# Patient Record
Sex: FEMALE | Race: WHITE | Employment: PART TIME | URBAN - METROPOLITAN AREA
[De-identification: names, ages, dates, MRNs, and addresses within clinical notes are randomized per-mention and may not be internally consistent; named-entity substitution may affect disease eponyms.]

---

## 2017-02-15 ENCOUNTER — OFFICE VISIT (OUTPATIENT)
Dept: NEUROLOGY | Facility: CLINIC | Age: 37
End: 2017-02-15

## 2017-02-15 VITALS
DIASTOLIC BLOOD PRESSURE: 80 MMHG | SYSTOLIC BLOOD PRESSURE: 130 MMHG | BODY MASS INDEX: 37 KG/M2 | WEIGHT: 218 LBS | HEART RATE: 96 BPM | RESPIRATION RATE: 18 BRPM

## 2017-02-15 DIAGNOSIS — G43.709 CHRONIC MIGRAINE WITHOUT AURA WITHOUT STATUS MIGRAINOSUS, NOT INTRACTABLE: Primary | ICD-10-CM

## 2017-02-15 PROCEDURE — 99215 OFFICE O/P EST HI 40 MIN: CPT | Performed by: OTHER

## 2017-02-15 RX ORDER — DIVALPROEX SODIUM 250 MG/1
500 TABLET, EXTENDED RELEASE ORAL DAILY
Qty: 60 TABLET | Refills: 2 | Status: SHIPPED | OUTPATIENT
Start: 2017-02-15 | End: 2017-05-26

## 2017-02-15 RX ORDER — SUMATRIPTAN 50 MG/1
50 TABLET, FILM COATED ORAL EVERY 2 HOUR PRN
COMMUNITY
End: 2017-02-15

## 2017-02-15 RX ORDER — SUMATRIPTAN 50 MG/1
50 TABLET, FILM COATED ORAL EVERY 2 HOUR PRN
Qty: 9 TABLET | Refills: 1 | Status: SHIPPED | OUTPATIENT
Start: 2017-02-15 | End: 2017-06-06

## 2017-02-15 NOTE — PATIENT INSTRUCTIONS
Refill policies:    • Allow 2 business days for refills; controlled substances may take longer.   • Contact your pharmacy at least 5 days prior to running out of medication and have them send an electronic request or submit request through the “request re your physician has recommended that you have a procedure or additional testing performed. [facility]) will contact your insurance carrier to obtain pre-certification or prior authorization.     Unfortunately, SCOTTIE has seen an increas

## 2017-02-15 NOTE — PROGRESS NOTES
[facility] Neurology outpatient progress note  Date of service: 2/15/2017    Patient here for a follow-up visit for migraine headache, Since last visit her headache has been worsening, almost daily; she has failed 2 types of preventive medication and many acute a Solution, INJECT UP TO 75 UNITS UNDER THE SKIN VIA INSULIN PUMP DAILY AS DIRECTED, Disp: 70 mL, Rfl: 1  •  Glucose Blood (ONETOUCH TEST) In Vitro Strip, 1 each by Other route 4 (four) times daily. , Disp: 360 strip, Rfl: 3  •  Fluticasone Propionate (FLONAS intractable  (primary encounter diagnosis): worsening  Plan:   [facility] SCOTTIE BOTOX REFERRAL,   divalproex Sodium  MG Oral Tablet 24 Hr  Cont effexor  imitrex prn  Headache diary advised  Medication overuse headache education given  PCP/ENT follow up re: si

## 2017-03-09 ENCOUNTER — TELEPHONE (OUTPATIENT)
Dept: NEUROLOGY | Facility: CLINIC | Age: 37
End: 2017-03-09

## 2017-03-09 DIAGNOSIS — G43.709 CHRONIC MIGRAINE WITHOUT AURA WITHOUT STATUS MIGRAINOSUS, NOT INTRACTABLE: Primary | ICD-10-CM

## 2017-03-09 NOTE — TELEPHONE ENCOUNTER
Called [employer] and spoke with JASWANT [facility] approval given for 1 treatment only from 2/17/17 - 8/17/17. She will fax me letter. approval [id number]    Call reference #[id number].  Time on call 15:31    I will [facility] AS[facility] appointment is scheduled

## 2017-03-09 NOTE — TELEPHONE ENCOUNTER
Please send a prescription to [facility]:    Botox 200 units,0 refills      Si units to be administered by MD into multiple sites of head, neck and scalp every 3 months for chronic migraine prophylaxis.      Patient appointment scheduled

## 2017-03-13 NOTE — TELEPHONE ENCOUNTER
Spoke to Britany Cervantes at [employer] she started verification of benefits ASA gave her insurance information.

## 2017-03-16 NOTE — TELEPHONE ENCOUNTER
Spoke to Satinder at [facility] they still to not have confirmation that the patient pd her insurance she ran the claim and it is still saying payment needed, she said she can send a fax to the insurance verification team to call the insurance to confirm payment

## 2017-03-21 NOTE — TELEPHONE ENCOUNTER
Received 1 Botox vial 200 unit.  Lot No. [id number]  Exp date 10/2019    Pt has an appt scheduled on 3/22/17

## 2017-03-22 ENCOUNTER — OFFICE VISIT (OUTPATIENT)
Dept: NEUROLOGY | Facility: CLINIC | Age: 37
End: 2017-03-22

## 2017-03-22 VITALS
DIASTOLIC BLOOD PRESSURE: 80 MMHG | SYSTOLIC BLOOD PRESSURE: 140 MMHG | WEIGHT: 218 LBS | BODY MASS INDEX: 37 KG/M2 | HEART RATE: 88 BPM | RESPIRATION RATE: 20 BRPM

## 2017-03-22 DIAGNOSIS — G43.709 CHRONIC MIGRAINE WITHOUT AURA WITHOUT STATUS MIGRAINOSUS, NOT INTRACTABLE: Primary | ICD-10-CM

## 2017-03-22 PROCEDURE — 99215 OFFICE O/P EST HI 40 MIN: CPT | Performed by: OTHER

## 2017-03-22 PROCEDURE — 64615 CHEMODENERV MUSC MIGRAINE: CPT | Performed by: OTHER

## 2017-03-22 NOTE — PATIENT INSTRUCTIONS
Refill policies:    • Allow 2 business days for refills; controlled substances may take longer.   • Contact your pharmacy at least 5 days prior to running out of medication and have them send an electronic request or submit request through the “request re your physician has recommended that you have a procedure or additional testing performed. [facility]) will contact your insurance carrier to obtain pre-certification or prior authorization.     Unfortunately, SCOTTIE has seen an increas

## 2017-03-22 NOTE — PROGRESS NOTES
[facility] Neurology outpatient progress note  Date of service: 3/22/2017    Patient here for a follow-up visit for migraine headache and scheduled 1st botox injection, Since last visit her headache has been worsening, almost daily; she has failed 2 types of prev Disp: , Rfl: 5  •  Omega-3-acid Ethyl Esters 1 G Oral Cap, Take 1 g by mouth daily. , Disp: , Rfl:   •  NOVOLOG 100 UNIT/ML Subcutaneous Solution, INJECT UP TO 75 UNITS UNDER THE SKIN VIA INSULIN PUMP DAILY AS DIRECTED, Disp: 70 mL, Rfl: 1  •  Fluticasone P migrainosus, not intractable  (primary encounter diagnosis): worsening  Plan:    Botox injection after risk and benefit explained    Cont depakote  Cont effexor  imitrex prn  Headache diary advised  Medication overuse headache education given  PCP/ENT foll

## 2017-03-22 NOTE — PROCEDURES
Date of service: 3/22/2017  Procedure: Botox injection -chemodenervation  Consent: obtained after explanation of procedure detail, benefits and risks    Indication:   -chronic migraine patient who suffers 15 or more days with headache lasting 4 hours a day

## 2017-05-26 DIAGNOSIS — G43.709 CHRONIC MIGRAINE WITHOUT AURA WITHOUT STATUS MIGRAINOSUS, NOT INTRACTABLE: Primary | ICD-10-CM

## 2017-05-26 RX ORDER — DIVALPROEX SODIUM 250 MG/1
500 TABLET, EXTENDED RELEASE ORAL DAILY
Qty: 60 TABLET | Refills: 0 | Status: SHIPPED | OUTPATIENT
Start: 2017-05-26 | End: 2017-06-27

## 2017-05-26 NOTE — TELEPHONE ENCOUNTER
Medication: Depakote    Date of last refill: 2/15/17 for #60/2 additional refills  [place]    Last office visit: 3/22/17  Due back to clinic per last office note:  3 months  Date next office visit scheduled:  6/28/17

## 2017-05-30 ENCOUNTER — TELEPHONE (OUTPATIENT)
Dept: NEUROLOGY | Facility: CLINIC | Age: 37
End: 2017-05-30

## 2017-05-30 DIAGNOSIS — G43.709 CHRONIC MIGRAINE WITHOUT AURA WITHOUT STATUS MIGRAINOSUS, NOT INTRACTABLE: Primary | ICD-10-CM

## 2017-05-30 NOTE — TELEPHONE ENCOUNTER
I need to reauthorize the Botox her insurance only allowed one treatment. Please call patient and see if she has had a reduction in her headaches intensity and frequency since Botox and what % of relief.

## 2017-05-30 NOTE — TELEPHONE ENCOUNTER
Please send a prescription to [facility]:    Botox 200 units, 3 refills      Si units to be administered by MD into multiple sites of head, neck and scalp every 3 months for chronic migraine prophylaxis.

## 2017-05-30 NOTE — TELEPHONE ENCOUNTER
Patient states she has noted great relief with her headaches/migraines since starting Botox. Intensity has decreased by at least 25% per patient. Frequency has been decreased by 50% per patient as well.

## 2017-06-06 DIAGNOSIS — G43.709 CHRONIC MIGRAINE WITHOUT AURA WITHOUT STATUS MIGRAINOSUS, NOT INTRACTABLE: Primary | ICD-10-CM

## 2017-06-06 NOTE — TELEPHONE ENCOUNTER
Medication: Sumatriptan    Date of last refill: 2/17/17 #9 with 1 refill  [place]    Last office visit: 3/22/2017  Due back to clinic per last office note:  3 months, botox for migraines  Date next office visit schedu

## 2017-06-07 RX ORDER — SUMATRIPTAN 50 MG/1
50 TABLET, FILM COATED ORAL EVERY 2 HOUR PRN
Qty: 9 TABLET | Refills: 1 | Status: SHIPPED | OUTPATIENT
Start: 2017-06-07 | End: 2018-12-12

## 2017-06-07 NOTE — TELEPHONE ENCOUNTER
Called [employer] spoke with Ed Botox (,[id number]) has been approved for 2 treatments    Approval #[id number] from 5/30/2017 - 11/29/17

## 2017-06-13 NOTE — TELEPHONE ENCOUNTER
Per Vania Vela at [facility] patient has given consent Botox to be delivered on 6-14-17

## 2017-06-14 NOTE — TELEPHONE ENCOUNTER
Received 1 Botox 200 unit vial.  Lot number [id number]  Exp Date 1/2020    Pt has a botox appt scheduled on 6/28/17

## 2017-06-27 DIAGNOSIS — G43.709 CHRONIC MIGRAINE WITHOUT AURA WITHOUT STATUS MIGRAINOSUS, NOT INTRACTABLE: ICD-10-CM

## 2017-06-27 RX ORDER — DIVALPROEX SODIUM 250 MG/1
500 TABLET, EXTENDED RELEASE ORAL DAILY
Qty: 60 TABLET | Refills: 2 | Status: SHIPPED | OUTPATIENT
Start: 2017-06-27 | End: 2017-09-27

## 2017-06-27 NOTE — TELEPHONE ENCOUNTER
Medication: Divalproex ER 250mg tab    Date of last refill: 5/26/17 (#60/0)  [place]    Last office visit: 3/22/2017  Due back to clinic per last office note:  3 months  Date next office visit scheduled:  Future Appo

## 2017-06-28 ENCOUNTER — OFFICE VISIT (OUTPATIENT)
Dept: NEUROLOGY | Facility: CLINIC | Age: 37
End: 2017-06-28

## 2017-06-28 ENCOUNTER — TELEPHONE (OUTPATIENT)
Dept: NEUROLOGY | Facility: CLINIC | Age: 37
End: 2017-06-28

## 2017-06-28 VITALS
HEART RATE: 72 BPM | BODY MASS INDEX: 37 KG/M2 | DIASTOLIC BLOOD PRESSURE: 80 MMHG | WEIGHT: 216 LBS | SYSTOLIC BLOOD PRESSURE: 136 MMHG | RESPIRATION RATE: 16 BRPM

## 2017-06-28 DIAGNOSIS — G43.709 CHRONIC MIGRAINE WITHOUT AURA WITHOUT STATUS MIGRAINOSUS, NOT INTRACTABLE: Primary | ICD-10-CM

## 2017-06-28 PROCEDURE — 99215 OFFICE O/P EST HI 40 MIN: CPT | Performed by: OTHER

## 2017-06-28 PROCEDURE — 64615 CHEMODENERV MUSC MIGRAINE: CPT | Performed by: OTHER

## 2017-06-28 NOTE — PROGRESS NOTES
[facility] Neurology outpatient progress note  Date of service: 6/28/2017    Patient here for a follow-up visit for migraine headache and scheduled 2nf botox injection, Since last visit her headache has improved with botox injection, she has failed 2 types of pre Hr, Take 1 capsule (75 mg total) by mouth daily. , Disp: 30 capsule, Rfl: 3  •  Venlafaxine HCl ER (EFFEXOR XR) 150 MG Oral Capsule SR 24 Hr, Take 2 capsules daily (Patient taking differently: Take 112.5 mg by mouth daily.  ), Disp: 60 capsule, Rfl: 11  • Neurological examination:  /80 (BP Location: Left arm, Patient Position: Sitting, Cuff Size: large)   Pulse 72   Resp 16   Wt 216 lb   LMP 06/28/2017   BMI 37.08 kg/m²   A & O X 3   Language - nl   Speech - nl   CN - intact   Motor 5/5 all extremit

## 2017-06-28 NOTE — PATIENT INSTRUCTIONS
Refill policies:    • Allow 2-3 business days for refills; controlled substances may take longer.   • Contact your pharmacy at least 5 days prior to running out of medication and have them send an electronic request or submit request through the [facility] have a procedure or additional testing performed. [facility]) will contact your insurance carrier to obtain pre-certification or prior authorization.     Unfortunately, SCOTTIE has seen an increase in denial of payment even though the p

## 2017-06-28 NOTE — PROCEDURES
Date of service: 6/28/2017  Procedure: Botox injection -chemodenervation  Consent: obtained after explanation of procedure detail, benefits and risks    Indication:   -chronic migraine patient who suffers 15 or more days with headache lasting 4 hours a day

## 2017-07-19 PROBLEM — F90.0 ATTENTION DEFICIT HYPERACTIVITY DISORDER (ADHD), PREDOMINANTLY INATTENTIVE TYPE: Status: ACTIVE | Noted: 2017-07-19

## 2017-07-20 PROCEDURE — 88175 CYTOPATH C/V AUTO FLUID REDO: CPT | Performed by: FAMILY MEDICINE

## 2017-08-21 PROBLEM — E78.2 MIXED HYPERLIPIDEMIA: Status: ACTIVE | Noted: 2017-08-21

## 2017-08-21 PROBLEM — E55.9 VITAMIN D DEFICIENCY: Status: ACTIVE | Noted: 2017-08-21

## 2017-08-21 PROBLEM — Z46.81 INSULIN PUMP TITRATION: Status: ACTIVE | Noted: 2017-08-21

## 2017-09-19 ENCOUNTER — TELEPHONE (OUTPATIENT)
Dept: SURGERY | Facility: CLINIC | Age: 37
End: 2017-09-19

## 2017-09-19 NOTE — TELEPHONE ENCOUNTER
Spoke to Adrienne Randall.  Tentatively scheduled delivery on 10/04/17 to [place] office    Contacted pt and advised to contact [facility] @ [phone] to give consent for delivery    Pt appt is on 10/11/17

## 2017-09-27 DIAGNOSIS — G43.709 CHRONIC MIGRAINE WITHOUT AURA WITHOUT STATUS MIGRAINOSUS, NOT INTRACTABLE: ICD-10-CM

## 2017-09-27 RX ORDER — DIVALPROEX SODIUM 250 MG/1
500 TABLET, EXTENDED RELEASE ORAL DAILY
Qty: 60 TABLET | Refills: 1 | Status: SHIPPED | OUTPATIENT
Start: 2017-09-27 | End: 2017-11-07

## 2017-09-27 NOTE — TELEPHONE ENCOUNTER
Medication: Divalproex ER    Date of last refill: 06/27/17  [place]    Last office visit: 06/28/17  Due back to clinic per last office note:  3 months  Date next office visit scheduled:  10/11/17    Last OV note rolando

## 2017-10-03 NOTE — TELEPHONE ENCOUNTER
Called [facility]. Spoke to rep Dobbins. Sent stat order to verify benefits.  Confirmed delivery address to [place] on 10/05/17

## 2017-10-05 NOTE — TELEPHONE ENCOUNTER
Called [facility] to confirm Botox shipped for delivery. Spoke to rep Booth. Benefits were verified but scheduled delivery is set for 10/06/17 to [place] office.  Confirmed delivery address

## 2017-10-06 NOTE — TELEPHONE ENCOUNTER
Botox received in [place] office [address] and placed in fridge. Received:  1 Vial, 200 Units of Botox  Lot:  [id number]  Exp:  May 2020  [facility]:  [id number]    Botox appointment scheduled for 10/11/17 in [place].

## 2017-10-11 ENCOUNTER — OFFICE VISIT (OUTPATIENT)
Dept: NEUROLOGY | Facility: CLINIC | Age: 37
End: 2017-10-11

## 2017-10-11 VITALS
HEIGHT: 63.5 IN | HEART RATE: 86 BPM | SYSTOLIC BLOOD PRESSURE: 130 MMHG | DIASTOLIC BLOOD PRESSURE: 84 MMHG | WEIGHT: 205 LBS | BODY MASS INDEX: 35.87 KG/M2 | RESPIRATION RATE: 16 BRPM

## 2017-10-11 DIAGNOSIS — G43.709 CHRONIC MIGRAINE WITHOUT AURA WITHOUT STATUS MIGRAINOSUS, NOT INTRACTABLE: Primary | ICD-10-CM

## 2017-10-11 PROCEDURE — 64615 CHEMODENERV MUSC MIGRAINE: CPT | Performed by: OTHER

## 2017-10-11 PROCEDURE — 99215 OFFICE O/P EST HI 40 MIN: CPT | Performed by: OTHER

## 2017-10-11 RX ORDER — ONDANSETRON 4 MG/1
4 TABLET, ORALLY DISINTEGRATING ORAL EVERY 8 HOURS PRN
Qty: 20 TABLET | Refills: 0 | Status: SHIPPED | OUTPATIENT
Start: 2017-10-11 | End: 2018-07-31 | Stop reason: ALTCHOICE

## 2017-10-11 NOTE — PATIENT INSTRUCTIONS
Refill policies:    • Allow 2-3 business days for refills; controlled substances may take longer.   • Contact your pharmacy at least 5 days prior to running out of medication and have them send an electronic request or submit request through the [facility] have a procedure or additional testing performed. [facility]) will contact your insurance carrier to obtain pre-certification or prior authorization.     Unfortunately, SCOTTIE has seen an increase in denial of payment even though the p

## 2017-10-11 NOTE — PROCEDURES
Date of service: 10/11/2017  Procedure: Botox injection -chemodenervation  Consent: obtained after explanation of procedure detail, benefits and risks    Indication:   -chronic migraine patient who suffers 15 or more days with headache lasting 4 hours a da

## 2017-10-11 NOTE — PROGRESS NOTES
[facility] Neurology outpatient progress note  Date of service: 10/11/2017    Patient here for a follow-up visit for migraine headache and scheduled 3rd botox injection, Since last visit her headache has improved with botox injection, she has failed preventive me prophylaxis, Disp: 200 Units, Rfl: 3  •  ALPRAZolam 0.25 MG Oral Tab, Take 1 tablet (0.25 mg total) by mouth 3 (three) times daily as needed for Sleep.  As needed, Disp: 90 tablet, Rfl: 0  •  NOVOLOG 100 UNIT/ML Subcutaneous Solution, INJECT 75 UNITS UNDER Onset   • Cancer Mother      brain    • Neurological Disorder Sister      meningioma   • Cancer Sister      cervical cancer   • Breast Cancer Paternal Grandmother 61   • Heart Disorder Paternal Grandfather    • Hypertension Paternal Grandfather    • Rossy

## 2017-10-18 PROBLEM — H93.293 ABNORMAL AUDITORY PERCEPTION, BILATERAL: Status: ACTIVE | Noted: 2017-10-18

## 2017-10-18 PROBLEM — H90.3 SENSORY HEARING LOSS, BILATERAL: Status: ACTIVE | Noted: 2017-10-18

## 2017-11-01 PROBLEM — E11.36 DIABETIC CATARACT (HCC): Status: ACTIVE | Noted: 2017-11-01

## 2017-11-01 PROBLEM — Z98.890 HX OF LASIK: Status: ACTIVE | Noted: 2017-11-01

## 2017-11-01 PROBLEM — E10.3593 TYPE 1 DIABETES MELLITUS WITH PROLIFERATIVE RETINOPATHY OF BOTH EYES WITHOUT MACULAR EDEMA (HCC): Status: ACTIVE | Noted: 2017-11-01

## 2017-11-29 ENCOUNTER — TELEPHONE (OUTPATIENT)
Dept: SURGERY | Facility: CLINIC | Age: 37
End: 2017-11-29

## 2017-11-29 NOTE — TELEPHONE ENCOUNTER
Botox Reauthorization Questionnaire:  1. Has the patient experienced a reduction of at least 7 headache days or 100 hours per month since starting Botox? yes  a. If yes, by what percentage? 75%  2.  Has the intensity and frequency of migraines decreased si

## 2017-12-18 ENCOUNTER — TELEPHONE (OUTPATIENT)
Dept: SURGERY | Facility: CLINIC | Age: 37
End: 2017-12-18

## 2017-12-18 DIAGNOSIS — G43.709 CHRONIC MIGRAINE WITHOUT AURA WITHOUT STATUS MIGRAINOSUS, NOT INTRACTABLE: Primary | ICD-10-CM

## 2017-12-18 NOTE — TELEPHONE ENCOUNTER
Please send a prescription to [facility]:    Botox 200 units, 3 refills      Si units to be administered by MD into multiple sites of head, neck and scalp every 3 months for chronic migraine prophylaxis.      Approved 17 - 12/15/18

## 2017-12-19 PROCEDURE — 82570 ASSAY OF URINE CREATININE: CPT | Performed by: INTERNAL MEDICINE

## 2017-12-19 PROCEDURE — 82043 UR ALBUMIN QUANTITATIVE: CPT | Performed by: INTERNAL MEDICINE

## 2017-12-27 NOTE — TELEPHONE ENCOUNTER
Per call to Vj Shah states that benefits are still being verified at this time. Can call back to schedule delivery after 01/01/18.

## 2018-01-02 NOTE — TELEPHONE ENCOUNTER
Per Elia López at [facility] they need patients consent I called patient she will call them today.

## 2018-01-03 NOTE — TELEPHONE ENCOUNTER
Per Yovana Rivera at [facility] patient has not called with consent. I called patient and had to leave a message asking her to call [facility] so we can get the botox delivered otherwise we will have to cancel her appointment.

## 2018-01-04 NOTE — TELEPHONE ENCOUNTER
[employer] and spoke with Joaquin Moore she said that patient did give consent and it will be delivered on 1/9/18. Confirmed [place] address.

## 2018-01-09 NOTE — TELEPHONE ENCOUNTER
Received Botox in [address],[place]. Lot [id number]   Exp Date:8/2020    Pt has Appt scheduled on 1-12-18 with Dr.Y Yuli Hood. Botox given to Nurse.

## 2018-01-17 ENCOUNTER — OFFICE VISIT (OUTPATIENT)
Dept: NEUROLOGY | Facility: CLINIC | Age: 38
End: 2018-01-17

## 2018-01-17 VITALS
HEART RATE: 96 BPM | SYSTOLIC BLOOD PRESSURE: 156 MMHG | DIASTOLIC BLOOD PRESSURE: 80 MMHG | BODY MASS INDEX: 34 KG/M2 | WEIGHT: 200 LBS | RESPIRATION RATE: 20 BRPM

## 2018-01-17 DIAGNOSIS — G43.709 CHRONIC MIGRAINE WITHOUT AURA WITHOUT STATUS MIGRAINOSUS, NOT INTRACTABLE: Primary | ICD-10-CM

## 2018-01-17 PROCEDURE — 64615 CHEMODENERV MUSC MIGRAINE: CPT | Performed by: OTHER

## 2018-01-17 PROCEDURE — 99215 OFFICE O/P EST HI 40 MIN: CPT | Performed by: OTHER

## 2018-01-17 NOTE — PROCEDURES
Date of service: 1/17/2018  Procedure: Botox injection -chemodenervation  Consent: obtained after explanation of procedure detail, benefits and risks    Indication:   -chronic migraine patient who suffers 15 or more days with headache lasting 4 hours a day

## 2018-01-17 NOTE — PROGRESS NOTES
[facility] Neurology outpatient progress note  Date of service: 1/17/2018    Patient here for a follow-up visit for migraine headache and scheduled 4th botox injection, Since last visit her headache has improved with botox injection overall, however, but she had Lisdexamfetamine Dimesylate (VYVANSE) 40 MG Oral Cap, Take 1 capsule (40 mg total) by mouth daily. , Disp: 30 capsule, Rfl: 0  •  ondansetron 4 MG Oral Tablet Dispersible, Take 1 tablet (4 mg total) by mouth every 8 (eight) hours as needed for Nausea., Disp Comment: s/p ocular muscle reconstruction  No date: TUBAL LIGATION  Social History:     Smoking status: Current Every Day Smoker  0.50 Packs/day     Types: Cigarettes    Smokeless tobacco: Current User    Alcohol use No    Comment: recovering alcholic injection in office today    Cont current meds  zofran prn  imitrex prn  Headache diary advised  Medication overuse headache education given  PCP/ENT follow up re: sinus condition  See orders and medications filed with this encounter.  The patient indicates

## 2018-01-17 NOTE — PROGRESS NOTES
States last Botox not as effective but not sure if that was due to weather. Paperwork noting that patient may bear financial responsibility for procedure(s) performed in clinic today signed prior to proceeding with procedure(s).     Furthermore, patient n

## 2018-01-17 NOTE — PATIENT INSTRUCTIONS
Refill policies:    • Allow 2-3 business days for refills; controlled substances may take longer.   • Contact your pharmacy at least 5 days prior to running out of medication and have them send an electronic request or submit request through the [facility] recommended that you have a procedure or additional testing performed. [facility]) will contact your insurance carrier to obtain pre-certification or prior authorization.     Unfortunately, [facility] has seen an increase in denial of paym

## 2018-04-06 ENCOUNTER — TELEPHONE (OUTPATIENT)
Dept: SURGERY | Facility: CLINIC | Age: 38
End: 2018-04-06

## 2018-04-06 NOTE — TELEPHONE ENCOUNTER
I called patient to start getting Botox delivered for her April 2018 appointment and she informed me that she has new insurance that started on 1/1/18 and she thought she gave that to us.l I explained that we did not have that in the system I would have to

## 2018-04-09 NOTE — TELEPHONE ENCOUNTER
This Botox should not be delivered on 4/11/18 the patient has new insurance for 2018 and there is no predetermination for the new plan (new ID# and group #)    I called [employer] and spoke with Emily Zamarripa and confirmed that a new predetermination needs to be done bec

## 2018-04-09 NOTE — TELEPHONE ENCOUNTER
Scheduled delivery for botox on Wednesday, April 11th. [facility]:  [facility] [facility], [address] [place] [address], [place], [address]. Attn:  Dr. Mo Scott. Will call pt to schedule an appt.

## 2018-04-26 NOTE — TELEPHONE ENCOUNTER
Called [facility] of [facility] and spoke with Nicolette Farley Botox was approved. Approval #[id number] - 6 visits from 4/6/18 - 10/6/18    Elizabeth Wiggins they had put the RX on hold and now Mitzi will start it again for processing gave her approval # and ID#.

## 2018-05-02 PROBLEM — E10.3593 TYPE 1 DIABETES MELLITUS WITH PROLIFERATIVE RETINOPATHY OF BOTH EYES WITHOUT MACULAR EDEMA (HCC): Status: RESOLVED | Noted: 2017-11-01 | Resolved: 2018-05-02

## 2018-05-02 PROBLEM — E10.3493: Status: ACTIVE | Noted: 2018-05-02

## 2018-05-02 PROBLEM — E10.36: Status: ACTIVE | Noted: 2017-11-01

## 2018-05-09 NOTE — TELEPHONE ENCOUNTER
Loki Christian from [facility] called and said that they can not process this patients Botox refill because she is no active ad of 5/1/18 and in her jef period. I called [employer] and that is correct she is only paid up until 5/1/18.     I called patient and left a

## 2018-05-09 NOTE — TELEPHONE ENCOUNTER
Pippa Weaver called from [facility] to have Botox delivered to the [place] office on 5/10/18 patient gave consent and copay if needed.  Confirmed address and hours

## 2018-05-10 NOTE — TELEPHONE ENCOUNTER
Received Botox in [address],[place]  Lot DO:[id number]   Exp Date:12/2020    Botox given to Nurse, Will Check with Dr.Y Michi Beaver for Appt to be scheduled for Pt.

## 2018-05-16 ENCOUNTER — OFFICE VISIT (OUTPATIENT)
Dept: NEUROLOGY | Facility: CLINIC | Age: 38
End: 2018-05-16

## 2018-05-16 VITALS
SYSTOLIC BLOOD PRESSURE: 136 MMHG | WEIGHT: 205 LBS | DIASTOLIC BLOOD PRESSURE: 78 MMHG | HEIGHT: 64 IN | BODY MASS INDEX: 35 KG/M2 | RESPIRATION RATE: 16 BRPM | HEART RATE: 96 BPM

## 2018-05-16 DIAGNOSIS — G43.709 CHRONIC MIGRAINE WITHOUT AURA WITHOUT STATUS MIGRAINOSUS, NOT INTRACTABLE: Primary | ICD-10-CM

## 2018-05-16 PROCEDURE — 99214 OFFICE O/P EST MOD 30 MIN: CPT | Performed by: OTHER

## 2018-05-16 PROCEDURE — 64615 CHEMODENERV MUSC MIGRAINE: CPT | Performed by: OTHER

## 2018-05-16 NOTE — PATIENT INSTRUCTIONS
Refill policies:    • Allow 2-3 business days for refills; controlled substances may take longer.   • Contact your pharmacy at least 5 days prior to running out of medication and have them send an electronic request or submit request through the “request re entire amount billed. Precertification and Prior Authorizations: If your physician has recommended that you have a procedure or additional testing performed.   [facility]) will contact your insurance carrier to obtain pre-certi

## 2018-05-16 NOTE — PROCEDURES
Date of service: 5/16/2018  Procedure: Botox injection -chemodenervation  Consent: obtained after explanation of procedure detail, benefits and risks    Indication:   -chronic migraine patient who suffers 15 or more days with headache lasting 4 hours a day

## 2018-05-16 NOTE — PROGRESS NOTES
[facility] Neurology outpatient progress note  Date of service: 5/16/2018    Patient here for a follow-up visit for migraine headache and scheduled 5th botox injection, Since last visit her headache has improved with botox injection.   She had about >10 days heada BLOOD SUGAR FOUR TIMES DAILY, Disp: 1 kit, Rfl: 0  •  atorvastatin 20 MG Oral Tab, Take 1 tablet (20 mg total) by mouth nightly., Disp: 90 tablet, Rfl: 3  •  OnabotulinumtoxinA (BOTOX) 200 units Injection Recon Soln, 155 units to be administered by MD into cerebral meninges     Strong family history   • Migraines    • Obesity      Past Surgical History:  No date:       Comment: x 4  No date: OTHER SURGICAL HISTORY      Comment: s/p LASIK  2017: OTHER SURGICAL HISTORY      Comment: Liposuction  No d education given  PCP/ENT follow up re: sinus condition  See orders and medications filed with this encounter. The patient indicates understanding of these issues and agrees with the plan.   Discussed with patient in detail regarding the adverse and side eff

## 2018-07-24 PROBLEM — Z80.3 FAMILY HISTORY OF BREAST CANCER: Status: ACTIVE | Noted: 2018-07-24

## 2018-07-24 PROBLEM — Z82.61 FAMILY HISTORY OF RHEUMATOID ARTHRITIS: Status: ACTIVE | Noted: 2018-07-24

## 2018-07-24 PROCEDURE — 87480 CANDIDA DNA DIR PROBE: CPT | Performed by: FAMILY MEDICINE

## 2018-07-24 PROCEDURE — 87491 CHLMYD TRACH DNA AMP PROBE: CPT | Performed by: FAMILY MEDICINE

## 2018-07-24 PROCEDURE — 87510 GARDNER VAG DNA DIR PROBE: CPT | Performed by: FAMILY MEDICINE

## 2018-07-24 PROCEDURE — 87591 N.GONORRHOEAE DNA AMP PROB: CPT | Performed by: FAMILY MEDICINE

## 2018-07-24 PROCEDURE — 87660 TRICHOMONAS VAGIN DIR PROBE: CPT | Performed by: FAMILY MEDICINE

## 2018-07-31 ENCOUNTER — APPOINTMENT (OUTPATIENT)
Dept: LAB | Facility: HOSPITAL | Age: 38
End: 2018-07-31
Payer: COMMERCIAL

## 2018-07-31 DIAGNOSIS — S82.63XA: ICD-10-CM

## 2018-07-31 PROBLEM — S82.61XA CLOSED HIGH LATERAL MALLEOLUS FRACTURE, RIGHT, INITIAL ENCOUNTER: Status: ACTIVE | Noted: 2018-07-31

## 2018-07-31 LAB
ATRIAL RATE: 78 BPM
P-R INTERVAL: 146 MS
Q-T INTERVAL: 364 MS
QRS DURATION: 82 MS
QTC CALCULATION (BEZET): 414 MS
R AXIS: -64 DEGREES
T AXIS: -63 DEGREES
VENTRICULAR RATE: 78 BPM

## 2018-07-31 PROCEDURE — 93005 ELECTROCARDIOGRAM TRACING: CPT

## 2018-07-31 PROCEDURE — 93010 ELECTROCARDIOGRAM REPORT: CPT | Performed by: INTERNAL MEDICINE

## 2018-07-31 RX ORDER — FLASH GLUCOSE SENSOR
KIT MISCELLANEOUS
COMMUNITY
End: 2020-11-02

## 2018-08-01 ENCOUNTER — ANESTHESIA EVENT (OUTPATIENT)
Dept: SURGERY | Facility: HOSPITAL | Age: 38
End: 2018-08-01

## 2018-08-02 ENCOUNTER — APPOINTMENT (OUTPATIENT)
Dept: GENERAL RADIOLOGY | Facility: HOSPITAL | Age: 38
End: 2018-08-02
Attending: ORTHOPAEDIC SURGERY
Payer: COMMERCIAL

## 2018-08-02 ENCOUNTER — HOSPITAL ENCOUNTER (OUTPATIENT)
Facility: HOSPITAL | Age: 38
Setting detail: HOSPITAL OUTPATIENT SURGERY
Discharge: HOME | End: 2018-08-02
Attending: ORTHOPAEDIC SURGERY | Admitting: ORTHOPAEDIC SURGERY
Payer: COMMERCIAL

## 2018-08-02 ENCOUNTER — SURGERY (OUTPATIENT)
Age: 38
End: 2018-08-02

## 2018-08-02 ENCOUNTER — ANESTHESIA (OUTPATIENT)
Dept: SURGERY | Facility: HOSPITAL | Age: 38
End: 2018-08-02

## 2018-08-02 VITALS
HEIGHT: 64 IN | OXYGEN SATURATION: 100 % | SYSTOLIC BLOOD PRESSURE: 130 MMHG | BODY MASS INDEX: 33.29 KG/M2 | DIASTOLIC BLOOD PRESSURE: 67 MMHG | RESPIRATION RATE: 16 BRPM | TEMPERATURE: 98 F | HEART RATE: 74 BPM | WEIGHT: 195 LBS

## 2018-08-02 DIAGNOSIS — S82.63XA: Primary | ICD-10-CM

## 2018-08-02 DIAGNOSIS — S82.61XA CLOSED HIGH LATERAL MALLEOLUS FRACTURE, RIGHT, INITIAL ENCOUNTER: ICD-10-CM

## 2018-08-02 DIAGNOSIS — M25.571 RIGHT ANKLE PAIN, UNSPECIFIED CHRONICITY: ICD-10-CM

## 2018-08-02 LAB
ANION GAP SERPL CALC-SCNC: 9 MMOL/L (ref 0–18)
BUN BLD-MCNC: 6 MG/DL (ref 8–20)
BUN/CREAT SERPL: 9.4 (ref 10–20)
CALCIUM BLD-MCNC: 8.9 MG/DL (ref 8.3–10.3)
CHLORIDE SERPL-SCNC: 97 MMOL/L (ref 101–111)
CO2 SERPL-SCNC: 25 MMOL/L (ref 22–32)
CREAT BLD-MCNC: 0.64 MG/DL (ref 0.55–1.02)
GLUCOSE BLD-MCNC: 173 MG/DL (ref 65–99)
GLUCOSE BLD-MCNC: 292 MG/DL (ref 65–99)
GLUCOSE BLD-MCNC: 305 MG/DL (ref 70–99)
OSMOLALITY SERPL CALC.SUM OF ELEC: 281 MOSM/KG (ref 275–295)
POCT LOT NUMBER: NORMAL
POCT URINE PREGNANCY: NEGATIVE
POTASSIUM SERPL-SCNC: 4.8 MMOL/L (ref 3.6–5.1)
SODIUM SERPL-SCNC: 131 MMOL/L (ref 136–144)

## 2018-08-02 PROCEDURE — 0QSJ04Z REPOSITION RIGHT FIBULA WITH INTERNAL FIXATION DEVICE, OPEN APPROACH: ICD-10-PCS | Performed by: ORTHOPAEDIC SURGERY

## 2018-08-02 PROCEDURE — 82962 GLUCOSE BLOOD TEST: CPT

## 2018-08-02 PROCEDURE — 3E0T3BZ INTRODUCTION OF ANESTHETIC AGENT INTO PERIPHERAL NERVES AND PLEXI, PERCUTANEOUS APPROACH: ICD-10-PCS | Performed by: ANESTHESIOLOGY

## 2018-08-02 PROCEDURE — 80048 BASIC METABOLIC PNL TOTAL CA: CPT | Performed by: ANESTHESIOLOGY

## 2018-08-02 PROCEDURE — 76001 XR FLUOROSCOPE EXAM >1 HR EXTENSIVE (CPT=76001): CPT | Performed by: ORTHOPAEDIC SURGERY

## 2018-08-02 PROCEDURE — 76942 ECHO GUIDE FOR BIOPSY: CPT | Performed by: ORTHOPAEDIC SURGERY

## 2018-08-02 DEVICE — SCREW CORT 3.5X16 4835-16-01
Type: IMPLANTABLE DEVICE | Site: ANKLE | Status: NON-FUNCTIONAL
Removed: 2019-03-07

## 2018-08-02 DEVICE — IMPLANTABLE DEVICE: Type: IMPLANTABLE DEVICE | Site: ANKLE | Status: FUNCTIONAL

## 2018-08-02 DEVICE — SCREW CORT 3.5X14 4835-14-01: Type: IMPLANTABLE DEVICE | Site: ANKLE | Status: FUNCTIONAL

## 2018-08-02 DEVICE — SCREW CORT 3.5X18 4835-18-01: Type: IMPLANTABLE DEVICE | Site: ANKLE | Status: FUNCTIONAL

## 2018-08-02 RX ORDER — ACETAMINOPHEN 500 MG
1000 TABLET ORAL EVERY 6 HOURS PRN
Status: ON HOLD | COMMUNITY
End: 2018-08-02

## 2018-08-02 RX ORDER — DEXTROSE MONOHYDRATE 25 G/50ML
50 INJECTION, SOLUTION INTRAVENOUS
Status: DISCONTINUED | OUTPATIENT
Start: 2018-08-02 | End: 2018-08-02 | Stop reason: HOSPADM

## 2018-08-02 RX ORDER — CEFAZOLIN SODIUM/WATER 2 G/20 ML
2 SYRINGE (ML) INTRAVENOUS ONCE
Status: COMPLETED | OUTPATIENT
Start: 2018-08-02 | End: 2018-08-02

## 2018-08-02 RX ORDER — HYDROCODONE BITARTRATE AND ACETAMINOPHEN 5; 325 MG/1; MG/1
2 TABLET ORAL AS NEEDED
Status: COMPLETED | OUTPATIENT
Start: 2018-08-02 | End: 2018-08-02

## 2018-08-02 RX ORDER — SODIUM CHLORIDE, SODIUM LACTATE, POTASSIUM CHLORIDE, CALCIUM CHLORIDE 600; 310; 30; 20 MG/100ML; MG/100ML; MG/100ML; MG/100ML
INJECTION, SOLUTION INTRAVENOUS CONTINUOUS
Status: DISCONTINUED | OUTPATIENT
Start: 2018-08-02 | End: 2018-08-02

## 2018-08-02 RX ORDER — NALOXONE HYDROCHLORIDE 0.4 MG/ML
80 INJECTION, SOLUTION INTRAMUSCULAR; INTRAVENOUS; SUBCUTANEOUS AS NEEDED
Status: DISCONTINUED | OUTPATIENT
Start: 2018-08-02 | End: 2018-08-02

## 2018-08-02 RX ORDER — ONDANSETRON 2 MG/ML
4 INJECTION INTRAMUSCULAR; INTRAVENOUS AS NEEDED
Status: DISCONTINUED | OUTPATIENT
Start: 2018-08-02 | End: 2018-08-02

## 2018-08-02 RX ORDER — ACETAMINOPHEN 500 MG
1000 TABLET ORAL ONCE
Status: DISCONTINUED | OUTPATIENT
Start: 2018-08-02 | End: 2018-08-02 | Stop reason: HOSPADM

## 2018-08-02 RX ORDER — LABETALOL HYDROCHLORIDE 5 MG/ML
5 INJECTION, SOLUTION INTRAVENOUS EVERY 5 MIN PRN
Status: DISCONTINUED | OUTPATIENT
Start: 2018-08-02 | End: 2018-08-02

## 2018-08-02 RX ORDER — MEPERIDINE HYDROCHLORIDE 25 MG/ML
12.5 INJECTION INTRAMUSCULAR; INTRAVENOUS; SUBCUTANEOUS AS NEEDED
Status: DISCONTINUED | OUTPATIENT
Start: 2018-08-02 | End: 2018-08-02

## 2018-08-02 RX ORDER — DIPHENHYDRAMINE HYDROCHLORIDE 50 MG/ML
12.5 INJECTION INTRAMUSCULAR; INTRAVENOUS AS NEEDED
Status: DISCONTINUED | OUTPATIENT
Start: 2018-08-02 | End: 2018-08-02

## 2018-08-02 RX ORDER — DEXTROSE MONOHYDRATE 25 G/50ML
50 INJECTION, SOLUTION INTRAVENOUS
Status: DISCONTINUED | OUTPATIENT
Start: 2018-08-02 | End: 2018-08-02

## 2018-08-02 RX ORDER — HYDROCODONE BITARTRATE AND ACETAMINOPHEN 5; 325 MG/1; MG/1
1 TABLET ORAL AS NEEDED
Status: COMPLETED | OUTPATIENT
Start: 2018-08-02 | End: 2018-08-02

## 2018-08-02 RX ORDER — MIDAZOLAM HYDROCHLORIDE 1 MG/ML
1 INJECTION INTRAMUSCULAR; INTRAVENOUS EVERY 5 MIN PRN
Status: DISCONTINUED | OUTPATIENT
Start: 2018-08-02 | End: 2018-08-02

## 2018-08-02 RX ORDER — HYDROCODONE BITARTRATE AND ACETAMINOPHEN 10; 325 MG/1; MG/1
1 TABLET ORAL EVERY 4 HOURS PRN
Qty: 40 TABLET | Refills: 0 | Status: SHIPPED | OUTPATIENT
Start: 2018-08-02 | End: 2018-08-07

## 2018-08-02 RX ORDER — MORPHINE SULFATE 4 MG/ML
2 INJECTION, SOLUTION INTRAMUSCULAR; INTRAVENOUS EVERY 5 MIN PRN
Status: DISCONTINUED | OUTPATIENT
Start: 2018-08-02 | End: 2018-08-02

## 2018-08-02 NOTE — H&P
History & Physical Examination    Patient Name: Isaac Linda  MRN: [id number]  [facility]: [id number]  YOB: 1980    Diagnosis: Right Patel C fibula fracture    Present Illness: HISTORY OF PRESENT ILLNESS:  Ms. Izzy Ko is a 38-yea seems like there is a little bit of widening, and I would probably recommend going ahead and performing open reduction and internal fixation to stabilize the fibula fracture into a better position and hopefully prevent any ankle instability down the line. differently: INJECT 75 UNITS UNDER THE SKIN VIA INSULIN PUMP DAILY AS DIRECTED. ACE. Basal rate: midnight  - 0600 2.0;  0600 - 1800 - 1.45; 1800 - midnight - 1.8. Insulin to carb ratio 1:15.  CF - 1:45.) Disp: 70 mL Rfl: 1 Taking   atorvastatin 20 Once   glucose (DEX4) oral liquid 15 g 15 g Oral Q15 Min PRN   Or      Glucose-Vitamin C (DEX-4) 4-0.006 g chewable tab 4 tablet 4 tablet Oral Q15 Min PRN   Or      dextrose 50 % injection 50 mL 50 mL Intravenous Q15 Min PRN   Or      glucose (DEX4) oral l Son    • ADHD Son         Smoking status: Current Every Day Smoker  1.00 Packs/day  For 20.00 Years     Types: Cigarettes    Smokeless tobacco: Current User    Alcohol use No    Comment: recovering alcholic       SYSTEM Check if Review is Normal Check if P

## 2018-08-02 NOTE — BRIEF OP NOTE
Pre-Operative Diagnosis: Closed high lateral malleolus fracture, right, initial encounter [S82.61XA]  Right ankle pain, unspecified chronicity [M25.571]     Post-Operative Diagnosis: Closed high lateral malleolus fracture, right, initial encounter [S50. Dilcia Kelsey

## 2018-08-02 NOTE — ANESTHESIA POSTPROCEDURE EVALUATION
[address] Patient Status:  Hospital Outpatient Surgery   Age/Gender 34year old female MRN [id number]   [facility] SURGERY Attending Dick Bates MD   Hosp Day # 0 PCP Colt Espinal MD       Anesthesia Pos

## 2018-08-02 NOTE — OPERATIVE REPORT
[facility]    PATIENT'S NAME: Jean Carlos Luz   ATTENDING PHYSICIAN: Ryder Mcgowan M.D. OPERATING PHYSICIAN: Ryder Mcgowan M.D.    PATIENT ACCOUNT#:   [de-identified]    LOCATION:  PACU [address] [place] [address] PACU 11 EDWP 10  MEDICAL RECORD #:   [id number] identified in the preoperative holding area and the correct ankle, which is the right ankle, was identified and marked. She received preoperative prophylactic antibiotics.   The patient was taken to the operative suite and placed supine on the operative ta screws and the 1 additional distal screw leaving the most distal screw open. AP and lateral plane fluoroscopy confirmed anatomic alignment of the fracture and excellent hardware positioning. I then performed stress x-ray under live fluoroscopy.   After 14:27:19  t: 08/02/2018 14:50:16  Job [id number]  BRISA/

## 2018-08-02 NOTE — ANESTHESIA PREPROCEDURE EVALUATION
PRE-OP EVALUATION    Patient Name: Jose Marina    Pre-op Diagnosis: Closed high lateral malleolus fracture, right, initial encounter [S82.61XA]  Right ankle pain, unspecified chronicity [M25.571]    Procedure(s):  OPEN REDUCTION INTERNAL FIXAT total) by mouth daily. Disp: 30 capsule Rfl: 0   ALPRAZolam 0.25 MG Oral Tab Take 1 tablet (0.25 mg total) by mouth 3 (three) times daily as needed for Sleep.  As needed Disp: 90 tablet Rfl: 5   NOVOLOG 100 UNIT/ML Subcutaneous Solution INJECT 75 UNITS UNDE Neuro/Psych      (+) depression  (+) anxiety  (-) CVA     (-) seizures    (+) headaches                 Past Surgical History:  No date:       Comment: x 4  No date: OTHER SURGICAL HISTORY      Comment: s/p LASIK  2017: OTHER SURGICAL HISTORY regional anesthesia  Plan/risks discussed with: patient                Present on Admission:  **None**

## 2018-08-17 PROBLEM — S82.61XD: Status: ACTIVE | Noted: 2018-08-17

## 2018-08-17 PROBLEM — Z87.81 S/P ORIF (OPEN REDUCTION INTERNAL FIXATION) FRACTURE: Status: ACTIVE | Noted: 2018-08-17

## 2018-08-17 PROBLEM — Z98.890 S/P ORIF (OPEN REDUCTION INTERNAL FIXATION) FRACTURE: Status: ACTIVE | Noted: 2018-08-17

## 2018-10-11 PROBLEM — E10.65 TYPE 1 DIABETES MELLITUS WITH HYPERGLYCEMIA (HCC): Status: ACTIVE | Noted: 2018-10-11

## 2018-11-07 PROBLEM — E10.3513 TYPE 1 DIABETES MELLITUS WITH PROLIFERATIVE RETINOPATHY OF BOTH EYES AND MACULAR EDEMA (HCC): Status: ACTIVE | Noted: 2018-05-02

## 2018-12-03 ENCOUNTER — TELEPHONE (OUTPATIENT)
Dept: SURGERY | Facility: CLINIC | Age: 38
End: 2018-12-03

## 2018-12-03 DIAGNOSIS — G43.709 CHRONIC MIGRAINE WITHOUT AURA WITHOUT STATUS MIGRAINOSUS, NOT INTRACTABLE: Primary | ICD-10-CM

## 2018-12-03 NOTE — TELEPHONE ENCOUNTER
Please send a prescription to [address] [place], [place] [address]way:    Botox 200 units, 3 refills      Si units to be administered by MD into multiple sites of head, neck and scalp every 3 months for chronic migraine prophylaxis.      ASAP patient has appointment

## 2018-12-07 PROCEDURE — 82570 ASSAY OF URINE CREATININE: CPT | Performed by: INTERNAL MEDICINE

## 2018-12-07 PROCEDURE — 82043 UR ALBUMIN QUANTITATIVE: CPT | Performed by: INTERNAL MEDICINE

## 2018-12-07 PROCEDURE — 86431 RHEUMATOID FACTOR QUANT: CPT | Performed by: FAMILY MEDICINE

## 2018-12-10 ENCOUNTER — TELEPHONE (OUTPATIENT)
Dept: SURGERY | Facility: CLINIC | Age: 38
End: 2018-12-10

## 2018-12-10 NOTE — TELEPHONE ENCOUNTER
This goes to [facility]. [facility] sent them RX I spoke with Sara Escudero the pharmacist to verify this is set at stat so we can have the medication by 12/11/18.

## 2018-12-10 NOTE — TELEPHONE ENCOUNTER
This is at stat I called [facility] and spoke with Jaciel he said this should be ready to ship by this afternoon.

## 2018-12-10 NOTE — TELEPHONE ENCOUNTER
Patient called back and said that she just called [facility] and gave consent and went over copay. I called [facility] and spoke with Zbigniew Crespo she spoke with her supervisor and it will be delivered on 12/11/18 to the [facility].

## 2018-12-10 NOTE — TELEPHONE ENCOUNTER
When I called to ge the Botox delivered I was told by Padmini that it is going to take 24-48 hours to go through insurance verification I asked to talk to a supervisor.  I spoke with Berna Salmon a supervisor in insurance verification and he said this will be ready f

## 2018-12-12 ENCOUNTER — OFFICE VISIT (OUTPATIENT)
Dept: NEUROLOGY | Facility: CLINIC | Age: 38
End: 2018-12-12
Payer: COMMERCIAL

## 2018-12-12 ENCOUNTER — TELEPHONE (OUTPATIENT)
Dept: NEUROLOGY | Facility: CLINIC | Age: 38
End: 2018-12-12

## 2018-12-12 VITALS
DIASTOLIC BLOOD PRESSURE: 74 MMHG | BODY MASS INDEX: 36 KG/M2 | SYSTOLIC BLOOD PRESSURE: 126 MMHG | RESPIRATION RATE: 18 BRPM | HEART RATE: 76 BPM | WEIGHT: 204 LBS

## 2018-12-12 DIAGNOSIS — G43.709 CHRONIC MIGRAINE WITHOUT AURA WITHOUT STATUS MIGRAINOSUS, NOT INTRACTABLE: Primary | ICD-10-CM

## 2018-12-12 PROCEDURE — 64615 CHEMODENERV MUSC MIGRAINE: CPT | Performed by: OTHER

## 2018-12-12 PROCEDURE — 99215 OFFICE O/P EST HI 40 MIN: CPT | Performed by: OTHER

## 2018-12-12 RX ORDER — SUMATRIPTAN 50 MG/1
50 TABLET, FILM COATED ORAL EVERY 2 HOUR PRN
Qty: 9 TABLET | Refills: 1 | Status: SHIPPED | OUTPATIENT
Start: 2018-12-12 | End: 2019-10-23

## 2018-12-12 RX ORDER — ONDANSETRON 4 MG/1
4 TABLET, ORALLY DISINTEGRATING ORAL EVERY 8 HOURS PRN
Qty: 10 TABLET | Refills: 0 | Status: SHIPPED | OUTPATIENT
Start: 2018-12-12 | End: 2019-10-23

## 2018-12-12 NOTE — TELEPHONE ENCOUNTER
Patient to start [address] [place] [address] for migraines. While in office, patient completed an Aimovig questionnaire. Rx sent directly to preferred pharmacy. RN demonstrated injection technique to patient using Demonstration Kit.   Patient given following Aimovig handout

## 2018-12-12 NOTE — PROGRESS NOTES
[facility] Neurology outpatient progress note  Date of service: 12/12/2018    Patient here for a follow-up visit for migraine headache and scheduled botox injection, Since last visit her headache has gotten worse in the past 1-2 weeks, she is overdue for botox in UNIT/ML Subcutaneous Solution, INJECT 75 UNITS UNDER THE SKIN DAILY AS DIRECTED VIA INSULIN PUMP, Disp: 70 mL, Rfl: 0  •  OnabotulinumtoxinA (BOTOX) 200 units Injection Recon Soln, 155 units to be administered by MD into multiple sites of head, neck and sc Allergies:    Dander                  ITCHING  Dust                    ITCHING    Comment:Sneezing  Pesticides              OTHER (SEE COMMENTS)    Comment: Throws up food that had been sprayed with             pesticides.   Past Medical History:   Diagnos Patient Position: Sitting, Cuff Size: large)   Pulse 76   Resp 18   Wt 204 lb   BMI 36.14 kg/m²   Lungs: clear to auscultation   CV: S1, S2+  Abdomen: soft, non tender, no masses  Extremities: no edema  Carotid bruits: no  Neurological Examination:  Mental if aimovig is helping migraine.     Romayne Backbone, MD  Neurology  [facility]  12/12/2018, 1:39 PM  Zeke Underwood MD

## 2018-12-12 NOTE — PROGRESS NOTES
The patient is here for Botox. The patient state her migraines have increased since her last office visit.

## 2018-12-12 NOTE — PROCEDURES
Date of service: 12/12/2018  Procedure: Botox injection -chemodenervation  Consent: obtained after explanation of procedure detail, benefits and risks    Indication:   -chronic migraine patient who suffers 15 or more days with headache lasting 4 hours a da

## 2018-12-13 NOTE — TELEPHONE ENCOUNTER
Had to call [employer] patient would not come up on cover my meds    Called [phone] and spoke with Marlen Morrow she said that this is not covered under patients plan but will sent for an exclusion of benefits.  He took all information and said it woul

## 2018-12-17 NOTE — TELEPHONE ENCOUNTER
Denied     Denial Reason: You must have failed two alternative drugs.  You may not have to try these drugs if your prescriber states you cannot take them due to a side effect or Food and Drug Administration (FDA) labeled contraindication to these alterna

## 2018-12-18 PROCEDURE — 36415 COLL VENOUS BLD VENIPUNCTURE: CPT | Performed by: INTERNAL MEDICINE

## 2018-12-18 PROCEDURE — 83516 IMMUNOASSAY NONANTIBODY: CPT | Performed by: INTERNAL MEDICINE

## 2018-12-18 PROCEDURE — 86256 FLUORESCENT ANTIBODY TITER: CPT | Performed by: INTERNAL MEDICINE

## 2018-12-18 NOTE — TELEPHONE ENCOUNTER
Spoke with PA team are looking into this as pt has tried/failed 2 medications: Topiramate & Divalproex.

## 2018-12-21 NOTE — TELEPHONE ENCOUNTER
Spoke to Luis Fernando at [employer] and she said if patient has tried the medication listed on the denial (which she has) then sent a fax to [phone] on the cover sheet with clinicals. Faxed today with fax confirmed received.

## 2018-12-21 NOTE — TELEPHONE ENCOUNTER
LMTCB to discuss that Yoel Burch is currently denied. Can still continue to use access card to get medication at little/no cost and we can try PA later as continuation therapy.

## 2018-12-21 NOTE — TELEPHONE ENCOUNTER
Denied     Denial Reason:    -You must have not received an injection of a drug called Botox in the last 2 months to help prevent headaches.     -You must not be starting treatment with an injection of a drug called Botox to help prevent headaches after sta

## 2019-01-04 PROBLEM — Z96.9 RETAINED ORTHOPEDIC HARDWARE: Status: ACTIVE | Noted: 2019-01-04

## 2019-01-04 PROBLEM — T84.84XA PAINFUL ORTHOPAEDIC HARDWARE (HCC): Status: ACTIVE | Noted: 2019-01-04

## 2019-01-07 NOTE — TELEPHONE ENCOUNTER
To date, pt has not returned call. Is on MyChart but per Epic review, not actively using. Letter sent to home address that Gay Emmanuelpablito currently denied but can continue to use Access Card. Can try PA at a later date if medication is helping her.

## 2019-01-14 NOTE — TELEPHONE ENCOUNTER
Pt states that pharmacy deleted old Rx, needing new one sent. Resent to preferred pharmacy. Pt has access card. No further action needed from out office at this time.

## 2019-02-21 ENCOUNTER — TELEPHONE (OUTPATIENT)
Dept: SURGERY | Facility: CLINIC | Age: 39
End: 2019-02-21

## 2019-02-21 DIAGNOSIS — G43.709 CHRONIC MIGRAINE WITHOUT AURA WITHOUT STATUS MIGRAINOSUS, NOT INTRACTABLE: Primary | ICD-10-CM

## 2019-02-21 NOTE — TELEPHONE ENCOUNTER
Please send a prescription to [facility]:    Botox 200 units, 3 refills      Si units to be administered by MD into multiple sites of head, neck and scalp every 3 months for chronic migraine prophylaxis.      Appointment 3/13/19

## 2019-02-25 ENCOUNTER — LAB ENCOUNTER (OUTPATIENT)
Dept: LAB | Age: 39
End: 2019-02-25
Attending: ORTHOPAEDIC SURGERY
Payer: COMMERCIAL

## 2019-02-25 DIAGNOSIS — E10.65 TYPE 1 DIABETES MELLITUS WITH HYPERGLYCEMIA (HCC): ICD-10-CM

## 2019-02-25 LAB
ALBUMIN SERPL-MCNC: 3.8 G/DL (ref 3.4–5)
ALBUMIN/GLOB SERPL: 1 {RATIO} (ref 1–2)
ALP LIVER SERPL-CCNC: 118 U/L (ref 37–98)
ALT SERPL-CCNC: 28 U/L (ref 13–56)
ANION GAP SERPL CALC-SCNC: 6 MMOL/L (ref 0–18)
AST SERPL-CCNC: 16 U/L (ref 15–37)
BILIRUB SERPL-MCNC: 0.6 MG/DL (ref 0.1–2)
BUN BLD-MCNC: 4 MG/DL (ref 7–18)
BUN/CREAT SERPL: 5.9 (ref 10–20)
CALCIUM BLD-MCNC: 8.7 MG/DL (ref 8.5–10.1)
CHLORIDE SERPL-SCNC: 95 MMOL/L (ref 98–107)
CO2 SERPL-SCNC: 29 MMOL/L (ref 21–32)
CREAT BLD-MCNC: 0.68 MG/DL (ref 0.55–1.02)
GLOBULIN PLAS-MCNC: 3.8 G/DL (ref 2.8–4.4)
GLUCOSE BLD-MCNC: 280 MG/DL (ref 70–99)
M PROTEIN MFR SERPL ELPH: 7.6 G/DL (ref 6.4–8.2)
OSMOLALITY SERPL CALC.SUM OF ELEC: 277 MOSM/KG (ref 275–295)
POTASSIUM SERPL-SCNC: 5.1 MMOL/L (ref 3.5–5.1)
SODIUM SERPL-SCNC: 130 MMOL/L (ref 136–145)

## 2019-02-25 PROCEDURE — 80053 COMPREHEN METABOLIC PANEL: CPT

## 2019-02-25 PROCEDURE — 36415 COLL VENOUS BLD VENIPUNCTURE: CPT

## 2019-02-25 NOTE — PROGRESS NOTES
Glucose 70 - 99 mg/dL 280 Abnormally high   260 Abnormally high   305 Abnormally high   236 Abnormally high   119 Abnormally high   209 Abnormally high  R  Sodium 136 - 145 mmol/L 130 Abnormally low   133 Abnormally low  R 131 Abnormally low  R 132 Abnorma

## 2019-03-02 PROBLEM — F17.200 SMOKER: Status: ACTIVE | Noted: 2019-03-02

## 2019-03-04 ENCOUNTER — LAB ENCOUNTER (OUTPATIENT)
Dept: LAB | Facility: HOSPITAL | Age: 39
End: 2019-03-04
Attending: FAMILY MEDICINE
Payer: COMMERCIAL

## 2019-03-04 ENCOUNTER — TELEPHONE (OUTPATIENT)
Dept: SURGERY | Facility: CLINIC | Age: 39
End: 2019-03-04

## 2019-03-04 DIAGNOSIS — R53.83 FATIGUE, UNSPECIFIED TYPE: ICD-10-CM

## 2019-03-04 DIAGNOSIS — E87.1 HYPONATREMIA: ICD-10-CM

## 2019-03-04 LAB
ANION GAP SERPL CALC-SCNC: 7 MMOL/L (ref 0–18)
BASOPHILS # BLD AUTO: 0.03 X10(3) UL (ref 0–0.2)
BASOPHILS NFR BLD AUTO: 0.3 %
BUN BLD-MCNC: 9 MG/DL (ref 7–18)
BUN/CREAT SERPL: 11.7 (ref 10–20)
CALCIUM BLD-MCNC: 8.7 MG/DL (ref 8.5–10.1)
CHLORIDE SERPL-SCNC: 98 MMOL/L (ref 98–107)
CO2 SERPL-SCNC: 28 MMOL/L (ref 21–32)
CREAT BLD-MCNC: 0.77 MG/DL (ref 0.55–1.02)
DEPRECATED RDW RBC AUTO: 39.6 FL (ref 35.1–46.3)
EOSINOPHIL # BLD AUTO: 0.11 X10(3) UL (ref 0–0.7)
EOSINOPHIL NFR BLD AUTO: 1.2 %
ERYTHROCYTE [DISTWIDTH] IN BLOOD BY AUTOMATED COUNT: 11.8 % (ref 11–15)
GLUCOSE BLD-MCNC: 272 MG/DL (ref 70–99)
HCT VFR BLD AUTO: 42.6 % (ref 35–48)
HGB BLD-MCNC: 15.2 G/DL (ref 12–16)
IMM GRANULOCYTES # BLD AUTO: 0.04 X10(3) UL (ref 0–1)
IMM GRANULOCYTES NFR BLD: 0.4 %
LYMPHOCYTES # BLD AUTO: 2.59 X10(3) UL (ref 1–4)
LYMPHOCYTES NFR BLD AUTO: 27.6 %
MCH RBC QN AUTO: 33.3 PG (ref 26–34)
MCHC RBC AUTO-ENTMCNC: 35.7 G/DL (ref 31–37)
MCV RBC AUTO: 93.2 FL (ref 80–100)
MONOCYTES # BLD AUTO: 0.67 X10(3) UL (ref 0.1–1)
MONOCYTES NFR BLD AUTO: 7.1 %
NEUTROPHILS # BLD AUTO: 5.95 X10 (3) UL (ref 1.5–7.7)
NEUTROPHILS # BLD AUTO: 5.95 X10(3) UL (ref 1.5–7.7)
NEUTROPHILS NFR BLD AUTO: 63.4 %
OSMOLALITY SERPL CALC.SUM OF ELEC: 284 MOSM/KG (ref 275–295)
PLATELET # BLD AUTO: 314 10(3)UL (ref 150–450)
PLATELET MORPHOLOGY: NORMAL
POTASSIUM SERPL-SCNC: 4.8 MMOL/L (ref 3.5–5.1)
RBC # BLD AUTO: 4.57 X10(6)UL (ref 3.8–5.3)
SODIUM SERPL-SCNC: 133 MMOL/L (ref 136–145)
WBC # BLD AUTO: 9.4 X10(3) UL (ref 4–11)

## 2019-03-04 PROCEDURE — 36415 COLL VENOUS BLD VENIPUNCTURE: CPT

## 2019-03-04 PROCEDURE — 80048 BASIC METABOLIC PNL TOTAL CA: CPT

## 2019-03-04 PROCEDURE — 85025 COMPLETE CBC W/AUTO DIFF WBC: CPT

## 2019-03-04 NOTE — TELEPHONE ENCOUNTER
[address] Wiggins and spoke to [facility] she said that this is still in insurance verification status it should be done soon.

## 2019-03-06 ENCOUNTER — LAB ENCOUNTER (OUTPATIENT)
Dept: LAB | Facility: HOSPITAL | Age: 39
End: 2019-03-06
Attending: FAMILY MEDICINE
Payer: COMMERCIAL

## 2019-03-06 ENCOUNTER — TELEPHONE (OUTPATIENT)
Dept: SURGERY | Facility: CLINIC | Age: 39
End: 2019-03-06

## 2019-03-06 DIAGNOSIS — R71.8 RBC MICROCYTOSIS: ICD-10-CM

## 2019-03-06 DIAGNOSIS — R71.8 ANISOCYTOSIS: ICD-10-CM

## 2019-03-06 DIAGNOSIS — D75.89 MACROCYTOSIS WITHOUT ANEMIA: ICD-10-CM

## 2019-03-06 DIAGNOSIS — R71.8 RED BLOOD CELL MORPHOLOGY ABNORMALITY: ICD-10-CM

## 2019-03-06 LAB
DEPRECATED HBV CORE AB SER IA-ACNC: 14 NG/ML (ref 12–240)
PLATELET MORPHOLOGY: NORMAL
VIT B12 SERPL-MCNC: 880 PG/ML (ref 193–986)

## 2019-03-06 PROCEDURE — 36415 COLL VENOUS BLD VENIPUNCTURE: CPT

## 2019-03-06 PROCEDURE — 82728 ASSAY OF FERRITIN: CPT

## 2019-03-06 PROCEDURE — 82607 VITAMIN B-12: CPT

## 2019-03-06 NOTE — TELEPHONE ENCOUNTER
I called [facility] and spoke with Ricardo English she said this has been sitting in insurance verification since February she transferred me to the insurance department, I spoke with Bigg she sent a email stat to have this finish gave her approval #.  I explaine

## 2019-03-08 NOTE — TELEPHONE ENCOUNTER
[address] Wiggins again and this is not ready yet I spoke with Melissa Ward a  she will sent a stat message today but it might take 24-48 hours, I explained her appointment is on 3/13/19 and it needs to delivered on 3/12/19.

## 2019-03-11 NOTE — TELEPHONE ENCOUNTER
[employer] and spoke with Mendel Maciel in the insurance verification department she had me on hold 50:23 while she called [employer] to verify she was covered and to get benefits.  I was transferred to the delivery department and spoke with Curry Aceves and she becky

## 2019-03-12 NOTE — TELEPHONE ENCOUNTER
[employer] and spoke with Conor she said patient has not given consent. I called the patient and canceled her appointment for 3/13/19 due to not having Botox in office.  I left a message asking her to call [facility] to go over benefits and give c

## 2019-03-13 NOTE — TELEPHONE ENCOUNTER
Lencho called [address] [facility] [facility] and said that patient has given a blanket consent today (as long as co-pay does not change its ok to ship). Botox will be delivered to the [place] office on 3/14/19.  Confirmed address and hours    ONCE BOTOX IS RECEIVED PL

## 2019-03-14 NOTE — TELEPHONE ENCOUNTER
Botox received in:  [facility], [address] [facility], [address] [place] [address]  [address]    Lot No: [id number] Exp Date: 06/2021  # Units: 200     Pt has Appt scheduled on 3/15/19  with Dr. Carmen Vergara.    Julianne Najera

## 2019-03-15 ENCOUNTER — TELEPHONE (OUTPATIENT)
Dept: NEUROLOGY | Facility: CLINIC | Age: 39
End: 2019-03-15

## 2019-03-26 ENCOUNTER — TELEPHONE (OUTPATIENT)
Dept: NEUROLOGY | Facility: CLINIC | Age: 39
End: 2019-03-26

## 2019-03-26 NOTE — TELEPHONE ENCOUNTER
Patient suffered possible concussion on 3/23.  Patient also has question about rescheduling botox due to head injury

## 2019-03-26 NOTE — TELEPHONE ENCOUNTER
Patient calling [facility] to state that she may have possible concussion. Patient states she hit her head on 3/23/2019 and felt nauseous and slept the entire weekend. Patient states she has a slight headache at this time.  Denies vision changes, severe headache o

## 2019-04-19 ENCOUNTER — OFFICE VISIT (OUTPATIENT)
Dept: NEUROLOGY | Facility: CLINIC | Age: 39
End: 2019-04-19
Payer: COMMERCIAL

## 2019-04-19 VITALS
SYSTOLIC BLOOD PRESSURE: 130 MMHG | HEART RATE: 78 BPM | DIASTOLIC BLOOD PRESSURE: 70 MMHG | RESPIRATION RATE: 16 BRPM | HEIGHT: 64 IN | BODY MASS INDEX: 37.22 KG/M2 | TEMPERATURE: 97 F | WEIGHT: 218 LBS

## 2019-04-19 DIAGNOSIS — G43.709 CHRONIC MIGRAINE WITHOUT AURA WITHOUT STATUS MIGRAINOSUS, NOT INTRACTABLE: Primary | ICD-10-CM

## 2019-04-19 PROCEDURE — 64615 CHEMODENERV MUSC MIGRAINE: CPT | Performed by: OTHER

## 2019-04-19 NOTE — PROCEDURES
Date of service: 4/19/2019  Procedure: Botox injection -chemodenervation  Consent: obtained after explanation of procedure detail, benefits and risks    Indication:   -chronic migraine patient who suffers 15 or more days with headache lasting 4 hours a day

## 2019-04-30 PROBLEM — E10.43: Status: ACTIVE | Noted: 2019-04-30

## 2019-05-22 PROBLEM — K21.9 GASTROESOPHAGEAL REFLUX DISEASE WITHOUT ESOPHAGITIS: Status: ACTIVE | Noted: 2019-05-22

## 2019-05-22 PROBLEM — R11.2 NAUSEA WITH VOMITING, UNSPECIFIED: Status: ACTIVE | Noted: 2019-05-22

## 2019-05-22 PROBLEM — R68.81 EARLY SATIETY: Status: ACTIVE | Noted: 2019-05-22

## 2019-05-22 PROBLEM — K29.30 CHRONIC SUPERFICIAL GASTRITIS WITHOUT BLEEDING: Status: ACTIVE | Noted: 2019-05-22

## 2019-06-21 ENCOUNTER — LAB ENCOUNTER (OUTPATIENT)
Dept: LAB | Facility: HOSPITAL | Age: 39
End: 2019-06-21
Attending: INTERNAL MEDICINE
Payer: COMMERCIAL

## 2019-06-21 ENCOUNTER — HOSPITAL ENCOUNTER (OUTPATIENT)
Dept: ULTRASOUND IMAGING | Facility: HOSPITAL | Age: 39
Discharge: HOME | End: 2019-06-21
Attending: INTERNAL MEDICINE
Payer: COMMERCIAL

## 2019-06-21 DIAGNOSIS — K21.9 GASTROESOPHAGEAL REFLUX DISEASE WITHOUT ESOPHAGITIS: ICD-10-CM

## 2019-06-21 DIAGNOSIS — K29.30 CHRONIC SUPERFICIAL GASTRITIS WITHOUT BLEEDING: ICD-10-CM

## 2019-06-21 DIAGNOSIS — R71.8 ABNORMAL RBC MORPHOLOGY: ICD-10-CM

## 2019-06-21 DIAGNOSIS — R68.81 EARLY SATIETY: ICD-10-CM

## 2019-06-21 DIAGNOSIS — R11.2 NAUSEA AND VOMITING, INTRACTABILITY OF VOMITING NOT SPECIFIED, UNSPECIFIED VOMITING TYPE: ICD-10-CM

## 2019-06-21 PROCEDURE — 83516 IMMUNOASSAY NONANTIBODY: CPT | Performed by: INTERNAL MEDICINE

## 2019-06-21 PROCEDURE — 36415 COLL VENOUS BLD VENIPUNCTURE: CPT | Performed by: INTERNAL MEDICINE

## 2019-06-21 PROCEDURE — 82784 ASSAY IGA/IGD/IGG/IGM EACH: CPT | Performed by: INTERNAL MEDICINE

## 2019-06-21 PROCEDURE — 76700 US EXAM ABDOM COMPLETE: CPT | Performed by: INTERNAL MEDICINE

## 2019-06-21 PROCEDURE — 85025 COMPLETE CBC W/AUTO DIFF WBC: CPT

## 2019-06-21 PROCEDURE — 80053 COMPREHEN METABOLIC PANEL: CPT | Performed by: INTERNAL MEDICINE

## 2019-06-21 PROCEDURE — 84439 ASSAY OF FREE THYROXINE: CPT | Performed by: INTERNAL MEDICINE

## 2019-06-21 NOTE — PROGRESS NOTES
The recheck of blood counts showed no significant abnormalities. Results sent to the patient through My Chart.

## 2019-07-03 ENCOUNTER — APPOINTMENT (OUTPATIENT)
Dept: LAB | Age: 39
End: 2019-07-03
Attending: INTERNAL MEDICINE
Payer: COMMERCIAL

## 2019-07-03 DIAGNOSIS — R74.8 ELEVATED ALKALINE PHOSPHATASE LEVEL: ICD-10-CM

## 2019-07-03 LAB — GGT SERPL-CCNC: 34 U/L (ref 5–55)

## 2019-07-03 PROCEDURE — 83516 IMMUNOASSAY NONANTIBODY: CPT

## 2019-07-03 PROCEDURE — 36415 COLL VENOUS BLD VENIPUNCTURE: CPT

## 2019-07-03 PROCEDURE — 82977 ASSAY OF GGT: CPT

## 2019-07-05 LAB — MITOCHONDRIAL M2 AB, IGG: 13.2 UNITS

## 2019-07-08 ENCOUNTER — TELEPHONE (OUTPATIENT)
Dept: NEUROLOGY | Facility: CLINIC | Age: 39
End: 2019-07-08

## 2019-07-08 NOTE — TELEPHONE ENCOUNTER
Called [facility] back and spoke with Barb Espinoza they have patients consent. Botox will be delivered to the [place] office on 7/11/19.  Confirmed address and hours

## 2019-07-11 NOTE — TELEPHONE ENCOUNTER
Botox received in:  [facility] [place], [address] [facility] [address], [address] [place] [id number]  [address]    Lot No: [id number]  Exp Date: 01/2022  # Units: 200     Pt has Appt scheduled on 7/19/19 with Dr. Rob Owens.    Kamini Carvajal

## 2019-07-17 ENCOUNTER — HOSPITAL ENCOUNTER (OUTPATIENT)
Dept: NUCLEAR MEDICINE | Facility: HOSPITAL | Age: 39
Discharge: HOME | End: 2019-07-17
Attending: PHYSICIAN ASSISTANT
Payer: COMMERCIAL

## 2019-07-17 DIAGNOSIS — R10.9 ABDOMINAL CRAMPING: ICD-10-CM

## 2019-07-17 DIAGNOSIS — R10.11 RIGHT UPPER QUADRANT ABDOMINAL PAIN: ICD-10-CM

## 2019-07-17 DIAGNOSIS — R14.0 BLOATING: ICD-10-CM

## 2019-07-17 DIAGNOSIS — R11.0 NAUSEA: ICD-10-CM

## 2019-07-17 DIAGNOSIS — R19.5 LOOSE STOOLS: ICD-10-CM

## 2019-07-17 DIAGNOSIS — R19.8 IRREGULAR BOWEL HABITS: ICD-10-CM

## 2019-07-17 DIAGNOSIS — R10.13 EPIGASTRIC PAIN: ICD-10-CM

## 2019-07-17 PROCEDURE — 78227 HEPATOBIL SYST IMAGE W/DRUG: CPT | Performed by: PHYSICIAN ASSISTANT

## 2019-07-19 ENCOUNTER — OFFICE VISIT (OUTPATIENT)
Dept: NEUROLOGY | Facility: CLINIC | Age: 39
End: 2019-07-19
Payer: COMMERCIAL

## 2019-07-19 VITALS
RESPIRATION RATE: 16 BRPM | SYSTOLIC BLOOD PRESSURE: 122 MMHG | WEIGHT: 212 LBS | BODY MASS INDEX: 36 KG/M2 | HEART RATE: 68 BPM | DIASTOLIC BLOOD PRESSURE: 70 MMHG

## 2019-07-19 DIAGNOSIS — G43.709 CHRONIC MIGRAINE WITHOUT AURA WITHOUT STATUS MIGRAINOSUS, NOT INTRACTABLE: Primary | ICD-10-CM

## 2019-07-19 PROCEDURE — 64615 CHEMODENERV MUSC MIGRAINE: CPT | Performed by: OTHER

## 2019-07-19 PROCEDURE — 99214 OFFICE O/P EST MOD 30 MIN: CPT | Performed by: OTHER

## 2019-07-19 NOTE — PROGRESS NOTES
[facility] Neurology outpatient progress note  Date of service: 7/19/2019    Patient here for a follow-up visit for migraine headache and scheduled botox injection, Since last visit her headache has gotten worse in the past 1-2 weeks.  But botox did help her migra administered by MD into multiple sites of head, neck and scalp every 3 months for chronic migraine prophylaxis. , Disp: 200 Units, Rfl: 3  •  ATORVASTATIN 20 MG Oral Tab, TAKE 1 TABLET(20 MG) BY MOUTH EVERY NIGHT, Disp: 90 tablet, Rfl: 3  •  Glucose Blood ( Anesthesia complication    • Anxiety    • Arthritis    • Back pain    • Bad breath    • Bloating    • Body piercing    • Decorative tattoo    • Depression     Psychiatry: Dr. Fadi Garcia   • [facility]    • Diabetes mellitus type 1, controll Paternal Grandfather    • Hypertension Paternal Grandfather    • Depression Daughter    • Anxiety Daughter    • Anxiety Son    • Depression Son    • Breast Cancer Paternal Aunt 61   • Anxiety Son    • Depression Son    • ADHD Son       Physical Examination Lucyann Ormond, MD

## 2019-07-19 NOTE — PROCEDURES
Date of service: 7/19/2019  Procedure: Botox injection -chemodenervation  Consent: obtained after explanation of procedure detail, benefits and risks    Indication:   -chronic migraine patient who suffers 15 or more days with headache lasting 4 hours a day

## 2019-07-24 PROBLEM — T84.84XA PAINFUL ORTHOPAEDIC HARDWARE (HCC): Status: RESOLVED | Noted: 2019-01-04 | Resolved: 2019-07-24

## 2019-07-24 PROBLEM — E10.3593 TYPE 1 DIABETES MELLITUS WITH PROLIFERATIVE RETINOPATHY OF BOTH EYES WITHOUT MACULAR EDEMA (HCC): Status: RESOLVED | Noted: 2017-11-01 | Resolved: 2019-07-24

## 2019-08-05 PROBLEM — K31.84 GASTROPARESIS: Status: ACTIVE | Noted: 2019-08-05

## 2019-08-21 PROBLEM — R19.7 DIARRHEA: Status: ACTIVE | Noted: 2019-08-21

## 2019-09-30 ENCOUNTER — TELEPHONE (OUTPATIENT)
Dept: SURGERY | Facility: CLINIC | Age: 39
End: 2019-09-30

## 2019-09-30 NOTE — TELEPHONE ENCOUNTER
[facility] [phone] regarding predetermination faxed on 9/9/19 with confirmation confirmed. Spoke with Salo Anthony she said that no preD is found for codes  and [address]. Call reference #[id number].  Time on call 12:02    I re faxed as a

## 2019-10-14 NOTE — TELEPHONE ENCOUNTER
Approval received. Approval #[id number] 6 visits from 10/14/19-4/14/21    [facility] and spoke with Maria T Ariza this was close as starting to soon she will have to reopen it should be done by later today or tomorrow morning.

## 2019-10-15 NOTE — TELEPHONE ENCOUNTER
Elizabeth Wiggins and felix Browne patient has given a blanket consent but this is still in insurance verification. He spoke with that department to get this done stat so it can be delivered this week.

## 2019-10-16 NOTE — TELEPHONE ENCOUNTER
[facility] and spoke with Ele Palomares. Patient gave consent. The Botox will be shipped to the [place] office on 10/18/19. Confirmed address and hours    He indicated at the end that this has to go through there insurance department.  I explained yessica

## 2019-10-18 NOTE — TELEPHONE ENCOUNTER
Botox received in:  [facility], [address] [place], [place] [id number]  [address]    Lot No: [id number]  Exp Date: 04/2020  # Units: 200     Pt has Appt scheduled on 10/23/19 with Dr. Gregory Parrish

## 2019-10-19 ENCOUNTER — APPOINTMENT (OUTPATIENT)
Dept: LAB | Facility: HOSPITAL | Age: 39
End: 2019-10-19
Attending: PHYSICIAN ASSISTANT
Payer: COMMERCIAL

## 2019-10-19 DIAGNOSIS — R10.13 EPIGASTRIC PAIN: ICD-10-CM

## 2019-10-19 DIAGNOSIS — E10.649 TYPE 1 DIABETES MELLITUS WITH HYPOGLYCEMIA AND WITHOUT COMA (HCC): ICD-10-CM

## 2019-10-19 DIAGNOSIS — R74.8 ELEVATED LIVER ENZYMES: ICD-10-CM

## 2019-10-19 DIAGNOSIS — R19.5 LOOSE STOOLS: ICD-10-CM

## 2019-10-19 DIAGNOSIS — E10.3593 TYPE 1 DIABETES MELLITUS WITH PROLIFERATIVE RETINOPATHY OF BOTH EYES WITHOUT MACULAR EDEMA (HCC): ICD-10-CM

## 2019-10-19 DIAGNOSIS — R14.0 BLOATING: ICD-10-CM

## 2019-10-19 DIAGNOSIS — E10.43 TYPE 1 DIABETES MELLITUS WITH AUTONOMIC NEUROPATHY (HCC): ICD-10-CM

## 2019-10-19 DIAGNOSIS — Z46.81 INSULIN PUMP TITRATION: ICD-10-CM

## 2019-10-19 DIAGNOSIS — E10.65 TYPE 1 DIABETES MELLITUS WITH HYPERGLYCEMIA (HCC): ICD-10-CM

## 2019-10-19 DIAGNOSIS — R11.0 NAUSEA: ICD-10-CM

## 2019-10-19 DIAGNOSIS — R10.9 ABDOMINAL CRAMPING: ICD-10-CM

## 2019-10-19 DIAGNOSIS — R10.11 RIGHT UPPER QUADRANT ABDOMINAL PAIN: ICD-10-CM

## 2019-10-19 DIAGNOSIS — R19.8 IRREGULAR BOWEL HABITS: ICD-10-CM

## 2019-10-19 DIAGNOSIS — E78.2 MIXED HYPERLIPIDEMIA: ICD-10-CM

## 2019-10-19 PROCEDURE — 80061 LIPID PANEL: CPT

## 2019-10-19 PROCEDURE — 36415 COLL VENOUS BLD VENIPUNCTURE: CPT

## 2019-10-19 PROCEDURE — 80053 COMPREHEN METABOLIC PANEL: CPT

## 2019-10-19 PROCEDURE — 82043 UR ALBUMIN QUANTITATIVE: CPT

## 2019-10-19 PROCEDURE — 82306 VITAMIN D 25 HYDROXY: CPT

## 2019-10-19 PROCEDURE — 82570 ASSAY OF URINE CREATININE: CPT

## 2019-10-23 ENCOUNTER — OFFICE VISIT (OUTPATIENT)
Dept: NEUROLOGY | Facility: CLINIC | Age: 39
End: 2019-10-23
Payer: COMMERCIAL

## 2019-10-23 ENCOUNTER — TELEPHONE (OUTPATIENT)
Dept: NEUROLOGY | Facility: CLINIC | Age: 39
End: 2019-10-23

## 2019-10-23 VITALS
DIASTOLIC BLOOD PRESSURE: 76 MMHG | RESPIRATION RATE: 16 BRPM | WEIGHT: 208 LBS | BODY MASS INDEX: 36 KG/M2 | SYSTOLIC BLOOD PRESSURE: 128 MMHG | HEART RATE: 80 BPM

## 2019-10-23 DIAGNOSIS — G43.709 CHRONIC MIGRAINE WITHOUT AURA WITHOUT STATUS MIGRAINOSUS, NOT INTRACTABLE: ICD-10-CM

## 2019-10-23 PROCEDURE — 99214 OFFICE O/P EST MOD 30 MIN: CPT | Performed by: OTHER

## 2019-10-23 PROCEDURE — 64615 CHEMODENERV MUSC MIGRAINE: CPT | Performed by: OTHER

## 2019-10-23 RX ORDER — ONDANSETRON 4 MG/1
4 TABLET, ORALLY DISINTEGRATING ORAL EVERY 8 HOURS PRN
Qty: 10 TABLET | Refills: 0 | Status: SHIPPED | OUTPATIENT
Start: 2019-10-23 | End: 2020-02-10

## 2019-10-23 NOTE — PROCEDURES
Date of service: 10/23/2019  Procedure: Botox injection -chemodenervation  Consent: obtained after explanation of procedure detail, benefits and risks    Indication:   -chronic migraine patient who suffers 15 or more days with headache lasting 4 hours a da Neurology and Headache Medicine     Deni Rivera MD

## 2019-10-23 NOTE — TELEPHONE ENCOUNTER
Pt in office today for Botox with Dr. Vicki Buchanan. Pt wanting to try new anti-CGRP medications. Discussed with pt that insurance will not typically pay for both Botox and new migraine injectable medications. Verbalized understanding.      Is wanting to put [facility]

## 2019-10-23 NOTE — PROGRESS NOTES
[facility] Neurology outpatient progress note  Date of service: 10/23/2019    Patient here for a follow-up visit for migraine headache and scheduled botox injection, Since last visit her headache has gotten worse in the past 2-4 weeks before next injection.  But b TABLET BY MOUTH THREE TIMES DAILY AS NEEDED SLEEP, Disp: 90 tablet, Rfl: 0  •  Desvenlafaxine Succinate ER (PRISTIQ) 50 MG Oral Tablet 24 Hr, Take 1 tablet (50 mg total) by mouth daily with breakfast., Disp: 90 tablet, Rfl: 3  •  buPROPion HCl ER, XL, (WEL pesticides.   Past Medical History:   Diagnosis Date   • Abdominal distention    • Abdominal pain    • Alcoholism in recovery [facility])    • Anesthesia complication    • Anxiety    • Arthritis    • Back pain    • Bad breath    • Bloating    • Body piercing    • Mother         brain    • Neurological Disorder Sister         meningioma   • Cancer Sister         cervical cancer   • No Known Problems Father    • Breast Cancer Paternal Grandmother 56   • Heart Disorder Paternal Grandfather    • Hypertension Paternal G Sravani Mitchell MD

## 2019-11-11 ENCOUNTER — TELEPHONE (OUTPATIENT)
Dept: NEUROLOGY | Facility: CLINIC | Age: 39
End: 2019-11-11

## 2019-11-11 NOTE — TELEPHONE ENCOUNTER
Fax received from [address] [place] [address] stating patient is agreeable with trying Aimovig or Emgality. MLTCB to schedule iliana't with nurse for information and instruction regarding chosen CGRP.

## 2019-12-18 ENCOUNTER — APPOINTMENT (OUTPATIENT)
Dept: LAB | Age: 39
End: 2019-12-18
Attending: NURSE PRACTITIONER
Payer: COMMERCIAL

## 2019-12-18 DIAGNOSIS — E55.9 VITAMIN D DEFICIENCY: ICD-10-CM

## 2019-12-18 DIAGNOSIS — E10.649 TYPE 1 DIABETES MELLITUS WITH HYPOGLYCEMIA AND WITHOUT COMA (HCC): ICD-10-CM

## 2019-12-18 PROCEDURE — 84443 ASSAY THYROID STIM HORMONE: CPT

## 2019-12-18 PROCEDURE — 82306 VITAMIN D 25 HYDROXY: CPT

## 2020-01-28 ENCOUNTER — TELEPHONE (OUTPATIENT)
Dept: SURGERY | Facility: CLINIC | Age: 40
End: 2020-01-28

## 2020-01-28 NOTE — TELEPHONE ENCOUNTER
Spoke to ZOHREH Palmer at [employer] patient has given consent and the Botox will be delivered to the [place] office on 1/29/20.  Confirmed address and hours

## 2020-01-30 NOTE — TELEPHONE ENCOUNTER
[employer] and spoke with [facility] she said that the insurance is verified for 2020 but now they need a new consent for 2020. I did a three was call to the patient. She gave a blanket consent for 2020 and went over benefits.  [facility] said this woul

## 2020-01-30 NOTE — TELEPHONE ENCOUNTER
Called [facility] at 8:30am and spoke with oJnelle Diez. She said the insurance benefits needed verification. I asked then why was I able to set this up for delivery the patient has an appointment on 1/31/20.  She said she will send a stat to that department t

## 2020-01-31 ENCOUNTER — OFFICE VISIT (OUTPATIENT)
Dept: NEUROLOGY | Facility: CLINIC | Age: 40
End: 2020-01-31
Payer: COMMERCIAL

## 2020-01-31 ENCOUNTER — TELEPHONE (OUTPATIENT)
Dept: NEUROLOGY | Facility: CLINIC | Age: 40
End: 2020-01-31

## 2020-01-31 VITALS
WEIGHT: 221 LBS | HEART RATE: 66 BPM | BODY MASS INDEX: 38 KG/M2 | RESPIRATION RATE: 16 BRPM | SYSTOLIC BLOOD PRESSURE: 124 MMHG | DIASTOLIC BLOOD PRESSURE: 70 MMHG

## 2020-01-31 DIAGNOSIS — G43.009 MIGRAINE WITHOUT AURA AND WITHOUT STATUS MIGRAINOSUS, NOT INTRACTABLE: Primary | ICD-10-CM

## 2020-01-31 PROCEDURE — 64615 CHEMODENERV MUSC MIGRAINE: CPT | Performed by: OTHER

## 2020-01-31 PROCEDURE — 99214 OFFICE O/P EST MOD 30 MIN: CPT | Performed by: OTHER

## 2020-01-31 NOTE — TELEPHONE ENCOUNTER
Please ordered the following medication for this pt and provide education:    Ubrelvy 50mg oral tab  SIG: Take one tab by mouth at onset of migraine. May repeat dose after 2 hours. Max two doses in 24 hours.   Qty: 8  Refills: 0     Who should not take UB These are not all of the possible side effects of UBRELVY. What is UBRELVY™ (ubrogepant)? Katherne Colander is a prescription medicine used for the acute treatment of migraine attacks with or without aura in adults.  UBRELVY is not used to prevent migraine headac

## 2020-01-31 NOTE — TELEPHONE ENCOUNTER
Botox received in:  [facility], [address] [id number]  [address]    Lot No: [id number]  Exp Date: 6/22  # Units: 200     Pt has Appt scheduled on 1/31/2020 with Dr. Svitlana Reece.    Botox

## 2020-01-31 NOTE — PROCEDURES
Date of service: 1/31/2020  Procedure: Botox injection -chemodenervation  Consent: obtained after explanation of procedure detail, benefits and risks    Indication:   -chronic migraine patient who suffers 15 or more days with headache lasting 4 hours a day Neurology and Headache Medicine     Moncho Canales MD

## 2020-01-31 NOTE — TELEPHONE ENCOUNTER
See LOV note from 01/31/2020. Pt was educated, and given savings card for [employer]. Order called in to pt preferred pharmacy, spoke with Pharmacist Benito Knowles, received with read back confirmation.

## 2020-01-31 NOTE — PROGRESS NOTES
[facility] Neurology outpatient progress note  Date of service: 1/31/2020    Patient here for a follow-up visit for migraine headache and scheduled botox injection, Since last visit her headache has gotten worse in the past 2-4 weeks before next injection.  But sindy Solution, INJECT 75 UNITS UNDER THE SKIN DAILY AS DIRECTED VIA INSULIN PUMP, Disp: 70 mL, Rfl: 2  •  Continuous Blood Gluc Sensor (FREESTYLE ALISA 14 DAY SENSOR) Does not apply Misc, 1 each by Does not apply route every 14 (fourteen) days. , Disp: 6 each, R Medical History:   Diagnosis Date   • Abdominal distention    • Abdominal pain    • Alcoholism in recovery [facility])    • Anesthesia complication    • Anxiety    • Arthritis    • Back pain    • Bad breath    • Bloating    • Body piercing    • Decorative tattoo brain    • Neurological Disorder Sister         meningioma   • Cancer Sister         cervical cancer   • No Known Problems Father    • Breast Cancer Paternal Grandmother 61   • Heart Disorder Paternal Grandfather    • Hypertension Paternal Grandfather    •

## 2020-02-10 DIAGNOSIS — G43.709 CHRONIC MIGRAINE WITHOUT AURA WITHOUT STATUS MIGRAINOSUS, NOT INTRACTABLE: ICD-10-CM

## 2020-02-10 DIAGNOSIS — G43.009 MIGRAINE WITHOUT AURA AND WITHOUT STATUS MIGRAINOSUS, NOT INTRACTABLE: Primary | ICD-10-CM

## 2020-02-10 RX ORDER — ONDANSETRON 4 MG/1
4 TABLET, ORALLY DISINTEGRATING ORAL EVERY 8 HOURS PRN
Qty: 10 TABLET | Refills: 0 | Status: SHIPPED | OUTPATIENT
Start: 2020-02-10 | End: 2020-10-06

## 2020-02-10 NOTE — TELEPHONE ENCOUNTER
Medication: Ondansetron    Date of last refill: 10/23/19  (#10/0)  [place]     Last office visit: 1/31/2020  Due back to clinic per last office note:  3 months  Date next office visit scheduled:    Future Appointments

## 2020-03-09 DIAGNOSIS — G43.009 MIGRAINE WITHOUT AURA AND WITHOUT STATUS MIGRAINOSUS, NOT INTRACTABLE: Primary | ICD-10-CM

## 2020-03-09 NOTE — TELEPHONE ENCOUNTER
Medication: Ubrelvy 50 mg     Date of last refill: 1/31/20 (#8/0)  [place]    Last office visit: 1/31/2020  Due back to clinic per last office note:  3 months  Date next office visit scheduled:    Future Appointments yes

## 2020-04-22 ENCOUNTER — TELEPHONE (OUTPATIENT)
Dept: SURGERY | Facility: CLINIC | Age: 40
End: 2020-04-22

## 2020-04-22 NOTE — TELEPHONE ENCOUNTER
Please send a prescription to [address]:    Botox 200 units, 3 refills      Si units to be administered by MD into multiple sites of head, neck and scalp every 3 months for chronic migraine prophylaxis.

## 2020-04-23 ENCOUNTER — TELEPHONE (OUTPATIENT)
Dept: NEUROLOGY | Facility: CLINIC | Age: 40
End: 2020-04-23

## 2020-04-23 DIAGNOSIS — G43.009 MIGRAINE WITHOUT AURA AND WITHOUT STATUS MIGRAINOSUS, NOT INTRACTABLE: ICD-10-CM

## 2020-04-23 NOTE — TELEPHONE ENCOUNTER
Incoming fax asking for PA on medication. States that package comes in #10, 8 is what was originally prescribed. #10 needs PA. Referral placed.

## 2020-04-24 ENCOUNTER — TELEPHONE (OUTPATIENT)
Dept: SURGERY | Facility: CLINIC | Age: 40
End: 2020-04-24

## 2020-04-24 NOTE — TELEPHONE ENCOUNTER
lvm for patient that botox appointment on 5/13 has been cancelled please call back to reschedule. appointment can be booked on 5/4 5/5 5/6 or 5/7 afternoon. 2 per day.

## 2020-04-28 NOTE — TELEPHONE ENCOUNTER
Prior authorization for Ubrelvy 50mg submitted to [employer] via [employer], request pending review, pending reference #[id number]

## 2020-04-28 NOTE — TELEPHONE ENCOUNTER
Pt has converted Botox appt to a regular visit, wishing to discuss d/c botox and continue with anti-CGRP monthly injection instead.   Notifying PA team.

## 2020-04-29 ENCOUNTER — TELEMEDICINE (OUTPATIENT)
Dept: NEUROLOGY | Facility: CLINIC | Age: 40
End: 2020-04-29
Payer: COMMERCIAL

## 2020-04-29 DIAGNOSIS — G43.709 CHRONIC MIGRAINE WITHOUT AURA WITHOUT STATUS MIGRAINOSUS, NOT INTRACTABLE: Primary | ICD-10-CM

## 2020-04-29 PROCEDURE — 99214 OFFICE O/P EST MOD 30 MIN: CPT | Performed by: OTHER

## 2020-04-29 NOTE — PROGRESS NOTES
Video Visit Note     Date of service: 4/29/2020    Jose M Marina verbally consents to a Video Visit service on 4/29/2020.   Patient understands and accepts financial responsibility for any deductible, co-insurance and/or co-pays associated with t TEST) In Vitro Strip, TEST BLOOD SUGAR THREE TIMES DAILY, Disp: 300 strip, Rfl: 1  •  clonazePAM 0.5 MG Oral Tab, Take 1 tablet (0.5 mg total) by mouth nightly as needed for Anxiety. , Disp: 15 tablet, Rfl: 0  •  Continuous Blood Gluc  (FREESTYLE LI Misc, by Does not apply route. Continuous glucose monitoring system (CGM) patient uses to monitor glucose readings and pattern eliminating fingerstick calibrations. , Disp: , Rfl:   •  Movie Mouth CONTOUR NEXT MONITOR w/Device Does not apply Kit, TEST BLOOD SUGAR at [facility] MAIN OR   • COLONOSCOPY  2019   • EGD  2019   •       x 4   • OTHER SURGICAL HISTORY      s/p LASIK   • OTHER SURGICAL HISTORY  2017    Liposuction   • REMOVAL HARDWARE LOWER EXTREMITY Right 3/7/2019    Performed by [facility]  zofran prn  Headache diary advised  Medication overuse headache education given  RTC 3 -4 months    Please note that this visit was completed using two-way, real-time interactive audio and/or video communication.   This has been done in good cesar to

## 2020-05-04 ENCOUNTER — TELEPHONE (OUTPATIENT)
Dept: NEUROLOGY | Facility: CLINIC | Age: 40
End: 2020-05-04

## 2020-05-04 NOTE — TELEPHONE ENCOUNTER
Krysta Lomax MD  [facility] Nurse             aimovig ordered, please instruct and educate pt. AMANDA      Sent [employer] message.

## 2020-05-05 NOTE — TELEPHONE ENCOUNTER
Incoming fax states that Ubrelvy 50mg approved. Quantity approved: 10 tablets for 30 days  Case Number: [id number]  Approved 04/29/2020-04/29/2021    States that that changes to your benefit coverage may affect the authorization.      [employer] message sent

## 2020-05-08 ENCOUNTER — TELEPHONE (OUTPATIENT)
Dept: NEUROLOGY | Facility: CLINIC | Age: 40
End: 2020-05-08

## 2020-05-08 DIAGNOSIS — G43.709 CHRONIC MIGRAINE WITHOUT AURA WITHOUT STATUS MIGRAINOSUS, NOT INTRACTABLE: Primary | ICD-10-CM

## 2020-09-16 DIAGNOSIS — G43.009 MIGRAINE WITHOUT AURA AND WITHOUT STATUS MIGRAINOSUS, NOT INTRACTABLE: ICD-10-CM

## 2020-09-16 RX ORDER — UBROGEPANT 50 MG/1
TABLET ORAL
Qty: 8 TABLET | Refills: 0 | Status: SHIPPED | OUTPATIENT
Start: 2020-09-16 | End: 2020-10-06

## 2020-09-16 NOTE — TELEPHONE ENCOUNTER
Advised Patient to make an appt for medication refills. Patient wanted to schedule a telemedicine visit. Transferred to [facility] up front.        Medication: UBRELVY 50 MG Oral Tab    Date of last refill: 04/23/2020 (#10/0)  [place]

## 2020-10-06 ENCOUNTER — VIRTUAL PHONE E/M (OUTPATIENT)
Dept: NEUROLOGY | Facility: CLINIC | Age: 40
End: 2020-10-06
Payer: COMMERCIAL

## 2020-10-06 DIAGNOSIS — G43.009 MIGRAINE WITHOUT AURA AND WITHOUT STATUS MIGRAINOSUS, NOT INTRACTABLE: ICD-10-CM

## 2020-10-06 DIAGNOSIS — G43.709 CHRONIC MIGRAINE WITHOUT AURA WITHOUT STATUS MIGRAINOSUS, NOT INTRACTABLE: ICD-10-CM

## 2020-10-06 PROCEDURE — 99213 OFFICE O/P EST LOW 20 MIN: CPT | Performed by: OTHER

## 2020-10-06 RX ORDER — UBROGEPANT 100 MG/1
TABLET ORAL
Qty: 10 TABLET | Refills: 2 | Status: SHIPPED | OUTPATIENT
Start: 2020-10-06 | End: 2020-11-05

## 2020-10-06 RX ORDER — ERENUMAB-AOOE 140 MG/ML
140 INJECTION, SOLUTION SUBCUTANEOUS
Qty: 1 ML | Refills: 11 | Status: SHIPPED | OUTPATIENT
Start: 2020-10-06 | End: 2021-10-26

## 2020-10-06 RX ORDER — ONDANSETRON 4 MG/1
4 TABLET, ORALLY DISINTEGRATING ORAL EVERY 8 HOURS PRN
Qty: 20 TABLET | Refills: 1 | Status: SHIPPED | OUTPATIENT
Start: 2020-10-06 | End: 2021-05-05

## 2020-10-06 NOTE — PROGRESS NOTES
Virtual/Telephone Visit Note     Date of service: 10/6/2020    Kalie Marina verbally consents to a Virtual/Telephone Visit service on 10/6/2020.   Patient understands and accepts financial responsibility for any deductible, co-insurance and/or c Take 1 tablet (0.5 mg total) by mouth as needed for Anxiety. , Disp: 30 tablet, Rfl: 2  •  buPROPion HCl ER, XL, (WELLBUTRIN XL) 150 MG Oral Tablet 24 Hr, Take 1 tablet (150 mg total) by mouth every morning., Disp: 90 tablet, Rfl: 0  •  FREESTYLE ALISA 14 D apply Kit, TEST BLOOD SUGAR FOUR TIMES DAILY, Disp: 1 kit, Rfl: 0  •  ACCU-CHEK FASTCLIX LANCETS Does not apply Misc, Test blood sugar 4 times daily, Disp: 400 each, Rfl: 3  •  Omega-3-acid Ethyl Esters 1 G Oral Cap, Take 1 g by mouth daily. 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   There are limitations of this visit

## 2020-10-08 ENCOUNTER — HOSPITAL ENCOUNTER (OUTPATIENT)
Dept: ULTRASOUND IMAGING | Age: 40
Discharge: HOME | End: 2020-10-08
Attending: INTERNAL MEDICINE
Payer: COMMERCIAL

## 2020-10-08 DIAGNOSIS — K82.4 GALLBLADDER POLYP: ICD-10-CM

## 2020-10-08 DIAGNOSIS — R74.8 ELEVATED LIVER ENZYMES: ICD-10-CM

## 2020-10-08 PROCEDURE — 76700 US EXAM ABDOM COMPLETE: CPT | Performed by: INTERNAL MEDICINE

## 2020-10-19 PROBLEM — F41.9 ANXIETY DISORDER: Status: ACTIVE | Noted: 2020-10-19

## 2020-11-02 ENCOUNTER — OFFICE VISIT (OUTPATIENT)
Dept: SURGERY | Facility: CLINIC | Age: 40
End: 2020-11-02
Payer: COMMERCIAL

## 2020-11-02 VITALS — DIASTOLIC BLOOD PRESSURE: 87 MMHG | HEART RATE: 76 BPM | TEMPERATURE: 97 F | SYSTOLIC BLOOD PRESSURE: 142 MMHG

## 2020-11-02 DIAGNOSIS — K82.4 GALLBLADDER POLYP: Primary | ICD-10-CM

## 2020-11-02 DIAGNOSIS — E66.9 OBESITY (BMI 30-39.9): ICD-10-CM

## 2020-11-02 DIAGNOSIS — K31.84 GASTROPARESIS: ICD-10-CM

## 2020-11-02 DIAGNOSIS — K21.9 GASTROESOPHAGEAL REFLUX DISEASE WITHOUT ESOPHAGITIS: ICD-10-CM

## 2020-11-02 DIAGNOSIS — K80.50 BILIARY COLIC: ICD-10-CM

## 2020-11-02 DIAGNOSIS — E10.3593 TYPE 1 DIABETES MELLITUS WITH PROLIFERATIVE RETINOPATHY OF BOTH EYES WITHOUT MACULAR EDEMA (HCC): ICD-10-CM

## 2020-11-02 PROCEDURE — 3077F SYST BP >= 140 MM HG: CPT | Performed by: COLON & RECTAL SURGERY

## 2020-11-02 PROCEDURE — 99072 ADDL SUPL MATRL&STAF TM PHE: CPT | Performed by: COLON & RECTAL SURGERY

## 2020-11-02 PROCEDURE — 99244 OFF/OP CNSLTJ NEW/EST MOD 40: CPT | Performed by: COLON & RECTAL SURGERY

## 2020-11-02 PROCEDURE — 3079F DIAST BP 80-89 MM HG: CPT | Performed by: COLON & RECTAL SURGERY

## 2020-11-02 NOTE — H&P
New Patient Visit Note       Active Problems      1. Gallbladder polyp    2. Biliary colic    3. Gastroparesis    4. Gastroesophageal reflux disease without esophagitis    5.  Type 1 diabetes mellitus with proliferative retinopathy of both eyes without macu as a scribe in this encounter. The physician obtained a history, independently performed a physical exam, and developed an assessment and plan. The physician performed all medical decision making.     Dick Jovel PA-C    I agree with the note as s abnormalities R19.8 Other specified symptoms and signs involving the digestive system and a*     TECHNIQUE:  Tc99m labeled mebrofenin was injected IV, and dynamic images of the abdomen were obtained in the anterior projection for one hour.   This was follow • Loss of appetite    • Migraines    • Nausea    • Night sweats    • Obesity    • Osteoarthritis     hands   • Pain in joints    • PONV (postoperative nausea and vomiting)    • Sleep disturbance    • Stress    • Uncomfortable fullness after meals    • V Smokeless tobacco: Never Used    Substance and Sexual Activity      Alcohol use: No        Alcohol/week: 0.0 standard drinks        Comment: recovering alcholic      Drug use: No      Sexual activity: Yes        Partners: Male        Birth control/protecti Nausea., Disp: 20 tablet, Rfl: 1  •  Desvenlafaxine Succinate ER 50 MG Oral Tablet 24 Hr, TAKE 1 TABLET BY MOUTH EVERY DAY WITH BREAKFAST, Disp: 90 tablet, Rfl: 0  •  FREESTYLE ALISA 14 DAY SENSOR Does not apply Misc, USE AS DIRECTED EVERY 14 DAYS, Disp: 6 change and rash. Neurological: Negative for tremors, syncope and weakness. Hematological: Negative for adenopathy. Does not bruise/bleed easily. Psychiatric/Behavioral: Positive for behavioral problems and sleep disturbance.        Physical Findings place, and time. Skin: Skin is warm and dry. No rash noted. She is not diaphoretic. Psychiatric: She has a normal mood and affect. Her behavior is normal. Judgment and thought content normal.   Nursing note and vitals reviewed.           Assessment   Ga as listed above as well as type 1 diabetes. Clinical examination of the abdomen reveals it to be soft, obese, nondistended, mildly tender in the right upper quadrant with a negative Nelson sign, bowel sounds are normal activity normal pitch.   There  is

## 2020-11-02 NOTE — H&P (VIEW-ONLY)
New Patient Visit Note       Active Problems      1. Gallbladder polyp    2. Biliary colic    3. Gastroparesis    4. Gastroesophageal reflux disease without esophagitis    5.  Type 1 diabetes mellitus with proliferative retinopathy of both eyes without macu as a scribe in this encounter. The physician obtained a history, independently performed a physical exam, and developed an assessment and plan. The physician performed all medical decision making.     Gm Johnson PA-C    I agree with the note as s abnormalities R19.8 Other specified symptoms and signs involving the digestive system and a*     TECHNIQUE:  Tc99m labeled mebrofenin was injected IV, and dynamic images of the abdomen were obtained in the anterior projection for one hour.   This was follow • Loss of appetite    • Migraines    • Nausea    • Night sweats    • Obesity    • Osteoarthritis     hands   • Pain in joints    • PONV (postoperative nausea and vomiting)    • Sleep disturbance    • Stress    • Uncomfortable fullness after meals    • V Smokeless tobacco: Never Used    Substance and Sexual Activity      Alcohol use: No        Alcohol/week: 0.0 standard drinks        Comment: recovering alcholic      Drug use: No      Sexual activity: Yes        Partners: Male        Birth control/protecti Nausea., Disp: 20 tablet, Rfl: 1  •  Desvenlafaxine Succinate ER 50 MG Oral Tablet 24 Hr, TAKE 1 TABLET BY MOUTH EVERY DAY WITH BREAKFAST, Disp: 90 tablet, Rfl: 0  •  FREESTYLE ALISA 14 DAY SENSOR Does not apply Misc, USE AS DIRECTED EVERY 14 DAYS, Disp: 6 change and rash. Neurological: Negative for tremors, syncope and weakness. Hematological: Negative for adenopathy. Does not bruise/bleed easily. Psychiatric/Behavioral: Positive for behavioral problems and sleep disturbance.        Physical Findings place, and time. Skin: Skin is warm and dry. No rash noted. She is not diaphoretic. Psychiatric: She has a normal mood and affect. Her behavior is normal. Judgment and thought content normal.   Nursing note and vitals reviewed.           Assessment   Ga as listed above as well as type 1 diabetes. Clinical examination of the abdomen reveals it to be soft, obese, nondistended, mildly tender in the right upper quadrant with a negative Nelson sign, bowel sounds are normal activity normal pitch.   There  is

## 2020-11-03 ENCOUNTER — TELEPHONE (OUTPATIENT)
Dept: SURGERY | Facility: CLINIC | Age: 40
End: 2020-11-03

## 2020-11-03 DIAGNOSIS — K82.4 GALLBLADDER POLYP: Primary | ICD-10-CM

## 2020-11-03 NOTE — PATIENT INSTRUCTIONS
The patient presents today in consultation of Dr. Jaime Yanes for gallbladder polyps. The patient states that she has had issues with her GI tract for many years. She states that began 13 years ago after the birth of her last child.   She states she had issues gallbladder polyps that are less likely polyps and more likely possible developing stones. It is possible that she may have a polyp as well. Her HIDA scan did reproduce her symptoms.   I discussed the patient that I do recommend removing her gallbladder a

## 2020-11-13 RX ORDER — ACETAMINOPHEN 500 MG
1000 TABLET ORAL ONCE
Status: CANCELLED | OUTPATIENT
Start: 2020-11-13 | End: 2020-11-13

## 2020-11-13 RX ORDER — SODIUM CHLORIDE, SODIUM LACTATE, POTASSIUM CHLORIDE, CALCIUM CHLORIDE 600; 310; 30; 20 MG/100ML; MG/100ML; MG/100ML; MG/100ML
INJECTION, SOLUTION INTRAVENOUS CONTINUOUS
Status: CANCELLED | OUTPATIENT
Start: 2020-11-13

## 2020-11-17 ENCOUNTER — APPOINTMENT (OUTPATIENT)
Dept: LAB | Facility: HOSPITAL | Age: 40
End: 2020-11-17
Attending: COLON & RECTAL SURGERY
Payer: COMMERCIAL

## 2020-11-17 DIAGNOSIS — K81.9 CHOLECYSTITIS: ICD-10-CM

## 2020-11-20 ENCOUNTER — APPOINTMENT (OUTPATIENT)
Dept: GENERAL RADIOLOGY | Facility: HOSPITAL | Age: 40
End: 2020-11-20
Attending: COLON & RECTAL SURGERY
Payer: COMMERCIAL

## 2020-11-20 ENCOUNTER — ANESTHESIA EVENT (OUTPATIENT)
Dept: SURGERY | Facility: HOSPITAL | Age: 40
End: 2020-11-20
Payer: COMMERCIAL

## 2020-11-20 ENCOUNTER — HOSPITAL ENCOUNTER (OUTPATIENT)
Facility: HOSPITAL | Age: 40
Setting detail: HOSPITAL OUTPATIENT SURGERY
Discharge: HOME | End: 2020-11-20
Attending: COLON & RECTAL SURGERY | Admitting: COLON & RECTAL SURGERY
Payer: COMMERCIAL

## 2020-11-20 ENCOUNTER — ANESTHESIA (OUTPATIENT)
Dept: SURGERY | Facility: HOSPITAL | Age: 40
End: 2020-11-20
Payer: COMMERCIAL

## 2020-11-20 VITALS
TEMPERATURE: 98 F | HEIGHT: 64 IN | OXYGEN SATURATION: 99 % | HEART RATE: 87 BPM | DIASTOLIC BLOOD PRESSURE: 71 MMHG | BODY MASS INDEX: 36.62 KG/M2 | WEIGHT: 214.5 LBS | RESPIRATION RATE: 16 BRPM | SYSTOLIC BLOOD PRESSURE: 148 MMHG

## 2020-11-20 DIAGNOSIS — K82.4 GALLBLADDER POLYP: ICD-10-CM

## 2020-11-20 DIAGNOSIS — K81.9 CHOLECYSTITIS: Primary | ICD-10-CM

## 2020-11-20 PROCEDURE — 82962 GLUCOSE BLOOD TEST: CPT

## 2020-11-20 PROCEDURE — BF131ZZ FLUOROSCOPY OF GALLBLADDER AND BILE DUCTS USING LOW OSMOLAR CONTRAST: ICD-10-PCS | Performed by: COLON & RECTAL SURGERY

## 2020-11-20 PROCEDURE — 81025 URINE PREGNANCY TEST: CPT | Performed by: COLON & RECTAL SURGERY

## 2020-11-20 PROCEDURE — 0FT44ZZ RESECTION OF GALLBLADDER, PERCUTANEOUS ENDOSCOPIC APPROACH: ICD-10-PCS | Performed by: COLON & RECTAL SURGERY

## 2020-11-20 PROCEDURE — 88304 TISSUE EXAM BY PATHOLOGIST: CPT | Performed by: COLON & RECTAL SURGERY

## 2020-11-20 PROCEDURE — 74300 X-RAY BILE DUCTS/PANCREAS: CPT | Performed by: COLON & RECTAL SURGERY

## 2020-11-20 RX ORDER — HYDROCODONE BITARTRATE AND ACETAMINOPHEN 5; 325 MG/1; MG/1
1 TABLET ORAL EVERY 6 HOURS PRN
Qty: 15 TABLET | Refills: 0 | Status: SHIPPED | OUTPATIENT
Start: 2020-11-20 | End: 2020-11-30

## 2020-11-20 RX ORDER — NEOSTIGMINE METHYLSULFATE 1 MG/ML
INJECTION INTRAVENOUS AS NEEDED
Status: DISCONTINUED | OUTPATIENT
Start: 2020-11-20 | End: 2020-11-20 | Stop reason: SURG

## 2020-11-20 RX ORDER — ROCURONIUM BROMIDE 10 MG/ML
INJECTION, SOLUTION INTRAVENOUS AS NEEDED
Status: DISCONTINUED | OUTPATIENT
Start: 2020-11-20 | End: 2020-11-20 | Stop reason: SURG

## 2020-11-20 RX ORDER — HYDROMORPHONE HYDROCHLORIDE 1 MG/ML
0.4 INJECTION, SOLUTION INTRAMUSCULAR; INTRAVENOUS; SUBCUTANEOUS EVERY 5 MIN PRN
Status: DISCONTINUED | OUTPATIENT
Start: 2020-11-20 | End: 2020-11-20

## 2020-11-20 RX ORDER — ONDANSETRON 2 MG/ML
INJECTION INTRAMUSCULAR; INTRAVENOUS AS NEEDED
Status: DISCONTINUED | OUTPATIENT
Start: 2020-11-20 | End: 2020-11-20 | Stop reason: SURG

## 2020-11-20 RX ORDER — BUPIVACAINE HYDROCHLORIDE AND EPINEPHRINE 5; 5 MG/ML; UG/ML
INJECTION, SOLUTION EPIDURAL; INTRACAUDAL; PERINEURAL AS NEEDED
Status: DISCONTINUED | OUTPATIENT
Start: 2020-11-20 | End: 2020-11-20 | Stop reason: HOSPADM

## 2020-11-20 RX ORDER — KETOROLAC TROMETHAMINE 30 MG/ML
INJECTION, SOLUTION INTRAMUSCULAR; INTRAVENOUS AS NEEDED
Status: DISCONTINUED | OUTPATIENT
Start: 2020-11-20 | End: 2020-11-20 | Stop reason: SURG

## 2020-11-20 RX ORDER — ONDANSETRON 2 MG/ML
4 INJECTION INTRAMUSCULAR; INTRAVENOUS AS NEEDED
Status: DISCONTINUED | OUTPATIENT
Start: 2020-11-20 | End: 2020-11-20

## 2020-11-20 RX ORDER — SCOLOPAMINE TRANSDERMAL SYSTEM 1 MG/1
1 PATCH, EXTENDED RELEASE TRANSDERMAL
Status: DISCONTINUED | OUTPATIENT
Start: 2020-11-20 | End: 2020-11-20 | Stop reason: HOSPADM

## 2020-11-20 RX ORDER — HYDROCODONE BITARTRATE AND ACETAMINOPHEN 5; 325 MG/1; MG/1
2 TABLET ORAL AS NEEDED
Status: COMPLETED | OUTPATIENT
Start: 2020-11-20 | End: 2020-11-20

## 2020-11-20 RX ORDER — HYDROMORPHONE HYDROCHLORIDE 1 MG/ML
INJECTION, SOLUTION INTRAMUSCULAR; INTRAVENOUS; SUBCUTANEOUS
Status: COMPLETED
Start: 2020-11-20 | End: 2020-11-20

## 2020-11-20 RX ORDER — ACETAMINOPHEN 500 MG
1000 TABLET ORAL ONCE AS NEEDED
Status: DISCONTINUED | OUTPATIENT
Start: 2020-11-20 | End: 2020-11-20

## 2020-11-20 RX ORDER — ACETAMINOPHEN 500 MG
1000 TABLET ORAL EVERY 6 HOURS PRN
COMMUNITY
End: 2020-12-16 | Stop reason: ALTCHOICE

## 2020-11-20 RX ORDER — NALOXONE HYDROCHLORIDE 0.4 MG/ML
80 INJECTION, SOLUTION INTRAMUSCULAR; INTRAVENOUS; SUBCUTANEOUS AS NEEDED
Status: DISCONTINUED | OUTPATIENT
Start: 2020-11-20 | End: 2020-11-20

## 2020-11-20 RX ORDER — EPHEDRINE SULFATE 50 MG/ML
INJECTION INTRAVENOUS AS NEEDED
Status: DISCONTINUED | OUTPATIENT
Start: 2020-11-20 | End: 2020-11-20 | Stop reason: SURG

## 2020-11-20 RX ORDER — GLYCOPYRROLATE 0.2 MG/ML
INJECTION, SOLUTION INTRAMUSCULAR; INTRAVENOUS AS NEEDED
Status: DISCONTINUED | OUTPATIENT
Start: 2020-11-20 | End: 2020-11-20 | Stop reason: SURG

## 2020-11-20 RX ORDER — LIDOCAINE HYDROCHLORIDE 40 MG/ML
INJECTION, SOLUTION RETROBULBAR; TOPICAL AS NEEDED
Status: DISCONTINUED | OUTPATIENT
Start: 2020-11-20 | End: 2020-11-20 | Stop reason: SURG

## 2020-11-20 RX ORDER — DEXTROSE MONOHYDRATE 25 G/50ML
50 INJECTION, SOLUTION INTRAVENOUS
Status: DISCONTINUED | OUTPATIENT
Start: 2020-11-20 | End: 2020-11-20 | Stop reason: HOSPADM

## 2020-11-20 RX ORDER — DIPHENHYDRAMINE HYDROCHLORIDE 50 MG/ML
INJECTION INTRAMUSCULAR; INTRAVENOUS AS NEEDED
Status: DISCONTINUED | OUTPATIENT
Start: 2020-11-20 | End: 2020-11-20 | Stop reason: SURG

## 2020-11-20 RX ORDER — SCOLOPAMINE TRANSDERMAL SYSTEM 1 MG/1
PATCH, EXTENDED RELEASE TRANSDERMAL
Status: COMPLETED
Start: 2020-11-20 | End: 2020-11-20

## 2020-11-20 RX ORDER — DEXAMETHASONE SODIUM PHOSPHATE 4 MG/ML
VIAL (ML) INJECTION AS NEEDED
Status: DISCONTINUED | OUTPATIENT
Start: 2020-11-20 | End: 2020-11-20 | Stop reason: SURG

## 2020-11-20 RX ORDER — MEPERIDINE HYDROCHLORIDE 25 MG/ML
12.5 INJECTION INTRAMUSCULAR; INTRAVENOUS; SUBCUTANEOUS AS NEEDED
Status: DISCONTINUED | OUTPATIENT
Start: 2020-11-20 | End: 2020-11-20

## 2020-11-20 RX ORDER — METOCLOPRAMIDE HYDROCHLORIDE 5 MG/ML
10 INJECTION INTRAMUSCULAR; INTRAVENOUS AS NEEDED
Status: DISCONTINUED | OUTPATIENT
Start: 2020-11-20 | End: 2020-11-20

## 2020-11-20 RX ORDER — DEXTROSE MONOHYDRATE 25 G/50ML
50 INJECTION, SOLUTION INTRAVENOUS
Status: DISCONTINUED | OUTPATIENT
Start: 2020-11-20 | End: 2020-11-20

## 2020-11-20 RX ORDER — HEPARIN SODIUM 5000 [USP'U]/ML
5000 INJECTION, SOLUTION INTRAVENOUS; SUBCUTANEOUS ONCE
Status: COMPLETED | OUTPATIENT
Start: 2020-11-20 | End: 2020-11-20

## 2020-11-20 RX ORDER — MIDAZOLAM HYDROCHLORIDE 1 MG/ML
1 INJECTION INTRAMUSCULAR; INTRAVENOUS EVERY 5 MIN PRN
Status: DISCONTINUED | OUTPATIENT
Start: 2020-11-20 | End: 2020-11-20

## 2020-11-20 RX ORDER — HYDROCODONE BITARTRATE AND ACETAMINOPHEN 5; 325 MG/1; MG/1
1 TABLET ORAL AS NEEDED
Status: COMPLETED | OUTPATIENT
Start: 2020-11-20 | End: 2020-11-20

## 2020-11-20 RX ORDER — LIDOCAINE HYDROCHLORIDE 10 MG/ML
INJECTION, SOLUTION EPIDURAL; INFILTRATION; INTRACAUDAL; PERINEURAL AS NEEDED
Status: DISCONTINUED | OUTPATIENT
Start: 2020-11-20 | End: 2020-11-20 | Stop reason: SURG

## 2020-11-20 RX ORDER — SODIUM CHLORIDE, SODIUM LACTATE, POTASSIUM CHLORIDE, CALCIUM CHLORIDE 600; 310; 30; 20 MG/100ML; MG/100ML; MG/100ML; MG/100ML
INJECTION, SOLUTION INTRAVENOUS CONTINUOUS
Status: DISCONTINUED | OUTPATIENT
Start: 2020-11-20 | End: 2020-11-20

## 2020-11-20 RX ADMIN — DIPHENHYDRAMINE HYDROCHLORIDE 12.5 MG: 50 INJECTION INTRAMUSCULAR; INTRAVENOUS at 08:05:00

## 2020-11-20 RX ADMIN — ROCURONIUM BROMIDE 40 MG: 10 INJECTION, SOLUTION INTRAVENOUS at 07:54:00

## 2020-11-20 RX ADMIN — ONDANSETRON 4 MG: 2 INJECTION INTRAMUSCULAR; INTRAVENOUS at 08:52:00

## 2020-11-20 RX ADMIN — LIDOCAINE HYDROCHLORIDE 3 ML: 40 INJECTION, SOLUTION RETROBULBAR; TOPICAL at 07:54:00

## 2020-11-20 RX ADMIN — GLYCOPYRROLATE 0.4 MG: 0.2 INJECTION, SOLUTION INTRAMUSCULAR; INTRAVENOUS at 08:31:00

## 2020-11-20 RX ADMIN — ROCURONIUM BROMIDE 10 MG: 10 INJECTION, SOLUTION INTRAVENOUS at 08:15:00

## 2020-11-20 RX ADMIN — KETOROLAC TROMETHAMINE 30 MG: 30 INJECTION, SOLUTION INTRAMUSCULAR; INTRAVENOUS at 08:52:00

## 2020-11-20 RX ADMIN — DEXAMETHASONE SODIUM PHOSPHATE 4 MG: 4 MG/ML VIAL (ML) INJECTION at 08:05:00

## 2020-11-20 RX ADMIN — SODIUM CHLORIDE, SODIUM LACTATE, POTASSIUM CHLORIDE, CALCIUM CHLORIDE: 600; 310; 30; 20 INJECTION, SOLUTION INTRAVENOUS at 09:05:00

## 2020-11-20 RX ADMIN — EPHEDRINE SULFATE 10 MG: 50 INJECTION INTRAVENOUS at 08:27:00

## 2020-11-20 RX ADMIN — NEOSTIGMINE METHYLSULFATE 5 MG: 1 INJECTION INTRAVENOUS at 08:52:00

## 2020-11-20 RX ADMIN — LIDOCAINE HYDROCHLORIDE 50 MG: 10 INJECTION, SOLUTION EPIDURAL; INFILTRATION; INTRACAUDAL; PERINEURAL at 07:54:00

## 2020-11-20 RX ADMIN — GLYCOPYRROLATE 0.8 MG: 0.2 INJECTION, SOLUTION INTRAMUSCULAR; INTRAVENOUS at 08:52:00

## 2020-11-20 NOTE — ANESTHESIA PROCEDURE NOTES
Airway  Urgency: elective      General Information and Staff    Patient location during procedure: OR  Anesthesiologist: Evelin Patel MD  Resident/CRNA: Basil Hogan CRNA  Performed: CRNA     Indications and Patient Condition  Indications for air

## 2020-11-20 NOTE — INTERVAL H&P NOTE
Pre-op Diagnosis: Gallbladder polyp [K82.4]    The above referenced H&P was reviewed by Leny Zamora MD on 11/20/2020, the patient was examined and no significant changes have occurred in the patient's condition since the H&P was performed.   I discussed w

## 2020-11-20 NOTE — ANESTHESIA PREPROCEDURE EVALUATION
PRE-OP EVALUATION    Patient Name: Benjy Marina    Pre-op Diagnosis: Gallbladder polyp [K82.4]    Procedure(s):  LAPAROSCOPIC CHOLECYSTECTOMY WITH CHOLANGIOGRAM    Surgeon(s) and Role:     Wyatt Estrella MD - Primary    Pre-op vitals reviewed migrainosus, not intractable     Attention deficit hyperactivity disorder (ADHD), predominantly inattentive type     Insulin pump titration     Mixed hyperlipidemia     Vitamin D deficiency     Sensory hearing loss, bilateral     Abnormal auditory percepti Every Day Smoker        Packs/day: 0.50        Years: 20.00        Pack years: 10        Types: Cigarettes      Smokeless tobacco: Never Used    Alcohol use: No      Alcohol/week: 0.0 standard drinks      Comment: recovering alcholic      Drug use:  No

## 2020-11-20 NOTE — OPERATIVE REPORT
[facility]   Operative Note    Alexandria Marina Location: OR   CSN [id number] MRN [id number]   Admission Date 11/20/2020 Operation Date 11/20/2020   Attending Physician Aimee Hardwick MD Operating Physician Wyatt Gomez MD     Pre-Operative Alison patient underwent an uncomplicated procedure in the standard fashion. Description of Procedure: Following adequate general anesthesia, the patient was placed in the supine position on the operating room table.  The abdomen was scrubbed with Chlorhexidine duct was doubly clipped distally and divided between the distal and proximal clips. The cystic artery and all of its branches were identified, clipped, and divided between clips.   The gallbladder was removed from the gallbladder fossa of the liver bed usi

## 2020-11-20 NOTE — ANESTHESIA POSTPROCEDURE EVALUATION
[address] Patient Status:  Hospital Outpatient Surgery   Age/Gender 41year old female MRN [id number]   [facility] SURGERY Attending Dorys Slaughter MD   Hosp Day # 0 PCP Alberto Jordan MD       Anesthesia Post-

## 2020-11-24 ENCOUNTER — TELEPHONE (OUTPATIENT)
Dept: SURGERY | Facility: CLINIC | Age: 40
End: 2020-11-24

## 2020-11-24 ENCOUNTER — MED REC SCAN ONLY (OUTPATIENT)
Dept: SURGERY | Facility: CLINIC | Age: 40
End: 2020-11-24

## 2020-11-24 RX ORDER — HYDROCODONE BITARTRATE AND ACETAMINOPHEN 5; 325 MG/1; MG/1
1 TABLET ORAL EVERY 6 HOURS PRN
Qty: 15 TABLET | Refills: 0 | Status: SHIPPED | OUTPATIENT
Start: 2020-11-24 | End: 2020-12-16 | Stop reason: ALTCHOICE

## 2020-11-24 NOTE — TELEPHONE ENCOUNTER
Patient is s/p lap lew from 11/20 w DJP. Patient left msg w PSR requesting more pain meds. I called patient back. Patient rates pain 5/10. Is alternating w ibuprofen.  reports taking ibuprofen 600mg q 6 hours in combo w [place]. Ran out of Maddy Obrien today an

## 2020-11-30 ENCOUNTER — OFFICE VISIT (OUTPATIENT)
Dept: SURGERY | Facility: CLINIC | Age: 40
End: 2020-11-30

## 2020-11-30 VITALS
SYSTOLIC BLOOD PRESSURE: 142 MMHG | BODY MASS INDEX: 36.54 KG/M2 | HEART RATE: 102 BPM | WEIGHT: 214 LBS | OXYGEN SATURATION: 98 % | DIASTOLIC BLOOD PRESSURE: 98 MMHG | HEIGHT: 64 IN | RESPIRATION RATE: 16 BRPM | TEMPERATURE: 98 F

## 2020-11-30 DIAGNOSIS — K82.4 GALLBLADDER POLYP: Primary | ICD-10-CM

## 2020-11-30 DIAGNOSIS — K80.50 BILIARY COLIC: ICD-10-CM

## 2020-11-30 PROCEDURE — 3080F DIAST BP >= 90 MM HG: CPT | Performed by: COLON & RECTAL SURGERY

## 2020-11-30 PROCEDURE — 3077F SYST BP >= 140 MM HG: CPT | Performed by: COLON & RECTAL SURGERY

## 2020-11-30 PROCEDURE — 3008F BODY MASS INDEX DOCD: CPT | Performed by: COLON & RECTAL SURGERY

## 2020-11-30 RX ORDER — LISDEXAMFETAMINE DIMESYLATE 40 MG/1
CAPSULE ORAL
COMMUNITY
Start: 2020-11-29

## 2020-11-30 RX ORDER — UBROGEPANT 100 MG/1
TABLET ORAL
COMMUNITY
Start: 2020-11-16

## 2020-11-30 NOTE — PROGRESS NOTES
Post Operative Visit Note       Active Problems  1. Gallbladder polyp    2.  Biliary colic         Chief Complaint   Patient presents with:  Post-Op: Gallbladder , feels like insides are still swollen and sore         History of Present Illness   This patie Brody   • [facility]    • Diabetes mellitus type 1, controlled ([facility] 1989    Endo: Dr. Juan Mathis   • Easy bruising    • Eye disease    • Family history of benign neoplasm of cerebral meninges     Strong family history   • Fatigue    • Feelin Paternal Grandmother 61   • Heart Disorder Paternal Grandfather    • Hypertension Paternal Grandfather    • Depression Daughter    • Anxiety Daughter    • Neurological Disorder Daughter 14        benign brain tumor removed   • Anxiety Son    • Depression S cigarette use completely when on this medicine. May use each cartridge up to 20 minutes.  Taper use over 6-12 weeks, Disp: 168 each, Rfl: 0  •  insulin aspart (NOVOLOG) 100 UNIT/ML Subcutaneous Solution, INJECT up to 95 UNITS UNDER THE SKIN DAILY AS DIRECTE Systems  The Review of Systems has been reviewed by me during today. Review of Systems   Constitutional: Negative for chills, diaphoresis, fatigue, fever and unexpected weight change.    HENT: Negative for hearing loss, nosebleeds, sore throat and trouble and fibrotic. It is my opinion that the patient may get some relief from her gastroparesis symptoms with a nonfunctional gallbladder being removed. Undoubtedly this gallbladder contributed to postprandial nausea and some gastroparesis.   There was no tr

## 2020-11-30 NOTE — PATIENT INSTRUCTIONS
This patient underwent laparoscopic cholecystectomy with cholangiogram on November 20, 2020. It was done for reasons of biliary colic and a gallbladder polyp. Final diagnosis reveals chronic cholecystitis.   Operative findings included significant adhes

## 2020-12-10 ENCOUNTER — VIRTUAL PHONE E/M (OUTPATIENT)
Dept: NEUROLOGY | Facility: CLINIC | Age: 40
End: 2020-12-10
Payer: COMMERCIAL

## 2020-12-10 DIAGNOSIS — G43.709 CHRONIC MIGRAINE WITHOUT AURA WITHOUT STATUS MIGRAINOSUS, NOT INTRACTABLE: Primary | ICD-10-CM

## 2020-12-10 PROCEDURE — 99213 OFFICE O/P EST LOW 20 MIN: CPT | Performed by: OTHER

## 2020-12-10 RX ORDER — DIVALPROEX SODIUM 250 MG/1
250 TABLET, EXTENDED RELEASE ORAL NIGHTLY
Qty: 90 TABLET | Refills: 1 | Status: SHIPPED | OUTPATIENT
Start: 2020-12-10 | End: 2021-01-28

## 2020-12-10 NOTE — PROGRESS NOTES
Virtual/Telephone Visit Note     Date of service: 12/10/2020    Joshua Marina verbally consents to a Virtual/Telephone Visit service on 12/10/2020.   Patient understands and accepts financial responsibility for any deductible, co-insurance and/or mouth every morning., Disp: 90 tablet, Rfl: 0  •  clonazePAM 0.5 MG Oral Tab, Take 1 tablet (0.5 mg total) by mouth as needed for Anxiety. , Disp: 30 tablet, Rfl: 2  •  Erenumab-aooe (AIMOVIG) 140 MG/ML Subcutaneous Solution Auto-injector, Inject 1 mL (140 pain    • Alcoholism in recovery [facility])    • Anesthesia complication    • Anxiety    • Arthritis    • Attention deficit hyperactivity disorder (ADHD)    • Back pain    • Bad breath    • Bloating    • Body piercing    • Decorative tattoo    • Depression History:  Social History    Tobacco Use      Smoking status: Current Every Day Smoker        Packs/day: 0.50        Years: 20.00        Pack years: 10        Types: Cigarettes      Smokeless tobacco: Never Used    Alcohol use: No      Alcohol/week: 0.0 sta decision making. Appropriate medical decision-making and tests are ordered as detailed in the plan of care above.     Kitty Tyler MD   Neurology  [facility]  12/10/2020, 1:08 PM  CC: Naye Martinez MD

## 2020-12-28 ENCOUNTER — TELEPHONE (OUTPATIENT)
Dept: NEUROLOGY | Facility: CLINIC | Age: 40
End: 2020-12-28

## 2020-12-28 NOTE — TELEPHONE ENCOUNTER
Incoming fax asking for PA to be done on Aimovig 140mg    Per Epic review, 70mg of medication was PA'd and approved from 5/27/2020-11/27/2020. Pt had tele visit recently, to stay on Aimovig 140mg. [employer] message sent with reauth questions.

## 2020-12-29 NOTE — TELEPHONE ENCOUNTER
Spoke with patient and received answers to Aimovig re-authorization questions. Yes, 75%, yes 75%, yes,yes. PA for Aimovig re-authorization submitted on [facility], Key: [id number].

## 2021-01-12 NOTE — TELEPHONE ENCOUNTER
Espinoza Colleen from [facility], called to confirm the attestation and will be able to process the aimovig order after opening her Bridge card.

## 2021-02-04 PROBLEM — R19.7 DIARRHEA: Status: RESOLVED | Noted: 2019-08-21 | Resolved: 2021-02-04

## 2021-02-04 PROBLEM — K82.4 GALLBLADDER POLYP: Status: RESOLVED | Noted: 2020-11-02 | Resolved: 2021-02-04

## 2021-02-04 PROBLEM — R68.81 EARLY SATIETY: Status: RESOLVED | Noted: 2019-05-22 | Resolved: 2021-02-04

## 2021-02-04 PROBLEM — K80.50 BILIARY COLIC: Status: RESOLVED | Noted: 2020-11-02 | Resolved: 2021-02-04

## 2021-02-04 PROBLEM — S82.61XA CLOSED HIGH LATERAL MALLEOLUS FRACTURE, RIGHT, INITIAL ENCOUNTER: Status: RESOLVED | Noted: 2018-07-31 | Resolved: 2021-02-04

## 2021-02-04 PROBLEM — S82.61XD: Status: RESOLVED | Noted: 2018-08-17 | Resolved: 2021-02-04

## 2021-02-09 PROBLEM — T84.84XD PAIN DUE TO INTERNAL ORTHOPEDIC PROSTHETIC DEVICES, IMPLANTS AND GRAFTS, SUBSEQUENT ENCOUNTER: Status: ACTIVE | Noted: 2021-02-09

## 2021-05-03 ENCOUNTER — TELEPHONE (OUTPATIENT)
Dept: NEUROLOGY | Facility: CLINIC | Age: 41
End: 2021-05-03

## 2021-05-05 ENCOUNTER — OFFICE VISIT (OUTPATIENT)
Dept: NEUROLOGY | Facility: CLINIC | Age: 41
End: 2021-05-05
Payer: COMMERCIAL

## 2021-05-05 VITALS
SYSTOLIC BLOOD PRESSURE: 134 MMHG | WEIGHT: 218 LBS | HEART RATE: 70 BPM | DIASTOLIC BLOOD PRESSURE: 78 MMHG | BODY MASS INDEX: 37 KG/M2 | RESPIRATION RATE: 16 BRPM

## 2021-05-05 DIAGNOSIS — G43.709 CHRONIC MIGRAINE WITHOUT AURA WITHOUT STATUS MIGRAINOSUS, NOT INTRACTABLE: ICD-10-CM

## 2021-05-05 DIAGNOSIS — G43.009 MIGRAINE WITHOUT AURA AND WITHOUT STATUS MIGRAINOSUS, NOT INTRACTABLE: ICD-10-CM

## 2021-05-05 PROCEDURE — 99214 OFFICE O/P EST MOD 30 MIN: CPT | Performed by: OTHER

## 2021-05-05 PROCEDURE — 3075F SYST BP GE 130 - 139MM HG: CPT | Performed by: OTHER

## 2021-05-05 PROCEDURE — 3078F DIAST BP <80 MM HG: CPT | Performed by: OTHER

## 2021-05-05 RX ORDER — ONDANSETRON 4 MG/1
4 TABLET, ORALLY DISINTEGRATING ORAL EVERY 8 HOURS PRN
Qty: 20 TABLET | Refills: 3 | Status: SHIPPED | OUTPATIENT
Start: 2021-05-05

## 2021-05-05 RX ORDER — UBROGEPANT 100 MG/1
TABLET ORAL
Qty: 10 TABLET | Refills: 3 | Status: SHIPPED | OUTPATIENT
Start: 2021-05-05 | End: 2021-06-04

## 2021-05-05 NOTE — PROGRESS NOTES
[facility] Neurology Outpatient Progress Note  Date of service: 5/5/2021    Patient here for a follow-up visit for migraine. Since last visit, Migraine is improving with aimovig 140mg, no side effects reported. Lani Cordon helps acute attack.   [facility],[address] ALPRAZolam 0.25 MG Oral Tab, TAKE 1 TABLET BY MOUTH THREE TIMES DAILY AS NEEDED FOR  ANXIETY, Disp: 90 tablet, Rfl: 0  •  Acetaminophen 500 MG Oral Cap, Take by mouth., Disp: , Rfl:   •  ERGOCALCIFEROL 1.25 MG (51542 UT) Oral Cap, TAKE 1 CAPSULE BY MOUTH 1 Suspension, 2 sprays by Nasal route daily. , Disp: 1 Bottle, Rfl: 12  •  PASQUALE CONTOUR NEXT MONITOR w/Device Does not apply Kit, TEST BLOOD SUGAR FOUR TIMES DAILY, Disp: 1 kit, Rfl: 0  •  ACCU-CHEK FASTCLIX LANCETS Does not apply Misc, Test blood sugar 4 ti vomiting)    • Sleep disturbance    • Stress    • Type 1 diabetes mellitus (HCC)     insulin pump   • Uncomfortable fullness after meals    • Visual impairment     glasses   • Vomiting    • Wears hearing aid in both ears    • Weight gain      Past Surgical negative  Neck: supple    Test reviewed on 5/5/2021    A/P:   Chronic migraine without aura without status migrainosus, not intractable; stable     Plan:    Botox injection stopped  Cont aimovig 140  Mg   Ubreivy 100 mg prn for acute attack  zofran ODT  He

## 2021-05-06 ENCOUNTER — TELEPHONE (OUTPATIENT)
Dept: NEUROLOGY | Facility: CLINIC | Age: 41
End: 2021-05-06

## 2021-05-06 DIAGNOSIS — G43.709 CHRONIC MIGRAINE WITHOUT AURA WITHOUT STATUS MIGRAINOSUS, NOT INTRACTABLE: Primary | ICD-10-CM

## 2021-05-07 NOTE — TELEPHONE ENCOUNTER
Fax from [address] [place] [address],[address] received with approval for Ubrelvy 100 mg    Fax dated: 5/7/2021    Reference # [id number]    Start date: 5/6/2021  End date: 5/6/2022    Pt notified by [address]

## 2021-05-25 PROBLEM — S93.491A SPRAIN OF ANTERIOR TALOFIBULAR LIGAMENT OF RIGHT ANKLE: Status: ACTIVE | Noted: 2021-05-25

## 2021-08-18 PROBLEM — Z23 NEED FOR MMR VACCINE: Status: ACTIVE | Noted: 2021-08-18

## 2021-10-08 PROBLEM — S82.62XA CLOSED LOW LATERAL MALLEOLUS FRACTURE, LEFT, INITIAL ENCOUNTER: Status: ACTIVE | Noted: 2021-10-08

## 2021-10-26 DIAGNOSIS — G43.709 CHRONIC MIGRAINE WITHOUT AURA WITHOUT STATUS MIGRAINOSUS, NOT INTRACTABLE: Primary | ICD-10-CM

## 2021-10-26 RX ORDER — ERENUMAB-AOOE 140 MG/ML
INJECTION, SOLUTION SUBCUTANEOUS
Qty: 1 ML | Refills: 11 | Status: SHIPPED | OUTPATIENT
Start: 2021-10-26

## 2021-10-26 NOTE — TELEPHONE ENCOUNTER
Medication: AIMOVIG 140 MG/ML     Date of last refill: 01/26/2020 (#1/11)  [place]     Last office visit: 05/05/2021  Due back to clinic per last office note:  12 monts  Date next office visit scheduled:    Future Appoin

## 2021-12-27 ENCOUNTER — TELEPHONE (OUTPATIENT)
Dept: NEUROLOGY | Facility: CLINIC | Age: 41
End: 2021-12-27

## 2021-12-27 DIAGNOSIS — G43.709 CHRONIC MIGRAINE WITHOUT AURA WITHOUT STATUS MIGRAINOSUS, NOT INTRACTABLE: Primary | ICD-10-CM

## 2021-12-27 NOTE — TELEPHONE ENCOUNTER
[employer] A form for Aimovig 70mg re-authorization completed and faxed with fax confirmation received.

## 2021-12-28 NOTE — TELEPHONE ENCOUNTER
Fax from [employer] received with approval for [address] [place] [address]    Fax dated: 12/28/21    Start date: 12/30/21  End date: 12/30/22

## 2022-04-14 NOTE — TELEPHONE ENCOUNTER
Botox received in:  [facility] [employer] [address], [facility], [address] [place] [address]  [address]    Lot No: [id number]  Exp Date: 5/2021  # Units: 200     Pt has Appt scheduled on 12/12/2018 with Dr. Mike Mao.    B Refill Routing Note   Medication(s) are not appropriate for processing by [facility] for the following reason(s):      - Patient has not been seen in over 15 months by PCP  - Patient has been seen in the ED/Hospital since the last PCP visit    [facility] action(s):  Defer Medication-related problems identified: Requires appointment        Medication reconciliation completed: No     Appointments  past 12m or future 3m with PCP    Date Provider   Last Visit   10/14/2020 Watson Meek MD   Next Visit   Visit date not found Watson Meek MD   ED visits in past 90 days: 0        Note composed:12:25 PM 04/14/2022

## 2022-04-28 ENCOUNTER — TELEPHONE (OUTPATIENT)
Dept: NEUROLOGY | Facility: CLINIC | Age: 42
End: 2022-04-28

## 2022-04-28 NOTE — TELEPHONE ENCOUNTER
PA formed completed for Ubrelvy 100 mg & faxed to [employer]. Fax confirmed. Awaiting determination.

## 2022-08-25 DIAGNOSIS — G43.709 CHRONIC MIGRAINE WITHOUT AURA WITHOUT STATUS MIGRAINOSUS, NOT INTRACTABLE: ICD-10-CM

## 2022-08-25 DIAGNOSIS — G43.009 MIGRAINE WITHOUT AURA AND WITHOUT STATUS MIGRAINOSUS, NOT INTRACTABLE: ICD-10-CM

## 2022-08-25 RX ORDER — ONDANSETRON 4 MG/1
TABLET, ORALLY DISINTEGRATING ORAL
Qty: 20 TABLET | Refills: 3 | Status: SHIPPED | OUTPATIENT
Start: 2022-08-25

## 2022-11-22 DIAGNOSIS — G43.709 CHRONIC MIGRAINE WITHOUT AURA WITHOUT STATUS MIGRAINOSUS, NOT INTRACTABLE: ICD-10-CM

## 2022-11-22 RX ORDER — ERENUMAB-AOOE 140 MG/ML
INJECTION, SOLUTION SUBCUTANEOUS
Qty: 1 ML | Refills: 2 | Status: SHIPPED | OUTPATIENT
Start: 2022-11-22

## 2023-01-24 ENCOUNTER — TELEPHONE (OUTPATIENT)
Dept: NEUROLOGY | Facility: CLINIC | Age: 43
End: 2023-01-24

## 2023-01-24 NOTE — TELEPHONE ENCOUNTER
Incoming fax from [employer] asking for [address] on [address]    Per Epic review:    -LOV 5/5/2021  -no upcoming appts  -pt made aware via [employer] that appt was needed on refill TE 11/22/2022  -last PA done on TE 12/27/2021 and was approved until 12/30/2022      Needs appt.  [employer] message sent

## 2023-01-30 NOTE — TELEPHONE ENCOUNTER
Spoke with pt. Agreeable to making appt. Accepted 2/16/23 @ 10:40AM in [facility]. States that Duy Folds continues to help with migraines both is severity and frequency (75% better for both). Unable to proceed with PA through Epic. Did have attachment with PA form, printed out and will complete. Faxed to [employer], confirmation received.      Will await outcome

## 2023-01-30 NOTE — TELEPHONE ENCOUNTER
Medication/dose:  Aimovig 140mg  Last PA done:  2021    Outcome: approved until 2022    Reauthorization Questions for Aimovig 140m. Has the patient experienced a reduction of at least 7 headache days or 100 hours per month since starting medication?  yes       2. If yes, by what percentage?  75%    3. Has the intensity of migraines decreased since starting medication? yes     4. If yes, by what percentage?  75%    5. Does patient note a decrease in need for rescue medication? yes    6. Does patient note a decrease in sick days taken for migraines? yes    Additionally:  What other medications, if anything, has the patient tried and failed since the last PA was done?   N/A

## 2023-02-02 NOTE — TELEPHONE ENCOUNTER
Incoming fax from [employer] with approval on Aimovig 140mg    Approved: 02/02/23-02/02/24  Case#[id number]  Approved 1 per 28 days    Faxed approval to pharmacy. Left message on pt VM (ok per HIPAA) with approval as well.

## 2023-02-16 ENCOUNTER — TELEMEDICINE (OUTPATIENT)
Dept: NEUROLOGY | Facility: CLINIC | Age: 43
End: 2023-02-16
Payer: COMMERCIAL

## 2023-02-16 DIAGNOSIS — G43.709 CHRONIC MIGRAINE WITHOUT AURA WITHOUT STATUS MIGRAINOSUS, NOT INTRACTABLE: ICD-10-CM

## 2023-02-16 PROCEDURE — 99213 OFFICE O/P EST LOW 20 MIN: CPT | Performed by: OTHER

## 2023-02-16 RX ORDER — UBROGEPANT 100 MG/1
TABLET ORAL
Qty: 16 TABLET | Refills: 5 | Status: SHIPPED | OUTPATIENT
Start: 2023-02-16 | End: 2023-03-18

## 2023-02-16 RX ORDER — ERENUMAB-AOOE 140 MG/ML
140 INJECTION, SOLUTION SUBCUTANEOUS
Qty: 1 ML | Refills: 11 | Status: SHIPPED | OUTPATIENT
Start: 2023-02-16

## 2023-03-28 ENCOUNTER — APPOINTMENT (OUTPATIENT)
Dept: GENERAL RADIOLOGY | Facility: HOSPITAL | Age: 43
End: 2023-03-28
Attending: EMERGENCY MEDICINE
Payer: COMMERCIAL

## 2023-03-28 ENCOUNTER — HOSPITAL ENCOUNTER (EMERGENCY)
Facility: HOSPITAL | Age: 43
Discharge: HOME | End: 2023-03-28
Attending: EMERGENCY MEDICINE
Payer: COMMERCIAL

## 2023-03-28 ENCOUNTER — APPOINTMENT (OUTPATIENT)
Dept: GENERAL RADIOLOGY | Facility: HOSPITAL | Age: 43
End: 2023-03-28
Payer: COMMERCIAL

## 2023-03-28 VITALS
WEIGHT: 210 LBS | BODY MASS INDEX: 35.85 KG/M2 | HEART RATE: 76 BPM | HEIGHT: 64 IN | TEMPERATURE: 99 F | DIASTOLIC BLOOD PRESSURE: 93 MMHG | RESPIRATION RATE: 20 BRPM | SYSTOLIC BLOOD PRESSURE: 164 MMHG | OXYGEN SATURATION: 98 %

## 2023-03-28 DIAGNOSIS — M94.0 COSTOCHONDRITIS: Primary | ICD-10-CM

## 2023-03-28 DIAGNOSIS — R05.8 POST-VIRAL COUGH SYNDROME: ICD-10-CM

## 2023-03-28 LAB
ALBUMIN SERPL-MCNC: 3.5 G/DL (ref 3.4–5)
ALBUMIN/GLOB SERPL: 0.9 {RATIO} (ref 1–2)
ALP LIVER SERPL-CCNC: 112 U/L
ALT SERPL-CCNC: 44 U/L
ANION GAP SERPL CALC-SCNC: 2 MMOL/L (ref 0–18)
AST SERPL-CCNC: 27 U/L (ref 15–37)
BASOPHILS # BLD AUTO: 0.07 X10(3) UL (ref 0–0.2)
BASOPHILS NFR BLD AUTO: 0.5 %
BILIRUB SERPL-MCNC: 0.8 MG/DL (ref 0.1–2)
BUN BLD-MCNC: 9 MG/DL (ref 7–18)
CALCIUM BLD-MCNC: 8.7 MG/DL (ref 8.5–10.1)
CHLORIDE SERPL-SCNC: 105 MMOL/L (ref 98–112)
CO2 SERPL-SCNC: 28 MMOL/L (ref 21–32)
CREAT BLD-MCNC: 0.75 MG/DL
EOSINOPHIL # BLD AUTO: 0.07 X10(3) UL (ref 0–0.7)
EOSINOPHIL NFR BLD AUTO: 0.5 %
ERYTHROCYTE [DISTWIDTH] IN BLOOD BY AUTOMATED COUNT: 11.9 %
GFR SERPLBLD BASED ON 1.73 SQ M-ARVRAT: 101 ML/MIN/1.73M2 (ref 60–?)
GLOBULIN PLAS-MCNC: 3.9 G/DL (ref 2.8–4.4)
GLUCOSE BLD-MCNC: 146 MG/DL (ref 70–99)
HCT VFR BLD AUTO: 41.9 %
HGB BLD-MCNC: 14.5 G/DL
IMM GRANULOCYTES # BLD AUTO: 0.15 X10(3) UL (ref 0–1)
IMM GRANULOCYTES NFR BLD: 1.2 %
LYMPHOCYTES # BLD AUTO: 2.82 X10(3) UL (ref 1–4)
LYMPHOCYTES NFR BLD AUTO: 21.7 %
MCH RBC QN AUTO: 31.4 PG (ref 26–34)
MCHC RBC AUTO-ENTMCNC: 34.6 G/DL (ref 31–37)
MCV RBC AUTO: 90.7 FL
MONOCYTES # BLD AUTO: 0.64 X10(3) UL (ref 0.1–1)
MONOCYTES NFR BLD AUTO: 4.9 %
NEUTROPHILS # BLD AUTO: 9.25 X10 (3) UL (ref 1.5–7.7)
NEUTROPHILS # BLD AUTO: 9.25 X10(3) UL (ref 1.5–7.7)
NEUTROPHILS NFR BLD AUTO: 71.2 %
OSMOLALITY SERPL CALC.SUM OF ELEC: 281 MOSM/KG (ref 275–295)
PLATELET # BLD AUTO: 325 10(3)UL (ref 150–450)
POTASSIUM SERPL-SCNC: 4 MMOL/L (ref 3.5–5.1)
PROT SERPL-MCNC: 7.4 G/DL (ref 6.4–8.2)
RBC # BLD AUTO: 4.62 X10(6)UL
SODIUM SERPL-SCNC: 135 MMOL/L (ref 136–145)
TROPONIN I HIGH SENSITIVITY: 6 NG/L
WBC # BLD AUTO: 13 X10(3) UL (ref 4–11)

## 2023-03-28 PROCEDURE — 71046 X-RAY EXAM CHEST 2 VIEWS: CPT | Performed by: EMERGENCY MEDICINE

## 2023-03-28 PROCEDURE — 99284 EMERGENCY DEPT VISIT MOD MDM: CPT

## 2023-03-28 PROCEDURE — 93010 ELECTROCARDIOGRAM REPORT: CPT

## 2023-03-28 PROCEDURE — 80053 COMPREHEN METABOLIC PANEL: CPT | Performed by: EMERGENCY MEDICINE

## 2023-03-28 PROCEDURE — 85025 COMPLETE CBC W/AUTO DIFF WBC: CPT | Performed by: EMERGENCY MEDICINE

## 2023-03-28 PROCEDURE — 93005 ELECTROCARDIOGRAM TRACING: CPT

## 2023-03-28 PROCEDURE — 84484 ASSAY OF TROPONIN QUANT: CPT | Performed by: EMERGENCY MEDICINE

## 2023-03-28 PROCEDURE — 36415 COLL VENOUS BLD VENIPUNCTURE: CPT

## 2023-03-28 PROCEDURE — 85025 COMPLETE CBC W/AUTO DIFF WBC: CPT

## 2023-03-28 PROCEDURE — 99285 EMERGENCY DEPT VISIT HI MDM: CPT

## 2023-03-28 PROCEDURE — 80053 COMPREHEN METABOLIC PANEL: CPT

## 2023-03-28 RX ORDER — ALBUTEROL SULFATE 90 UG/1
4 AEROSOL, METERED RESPIRATORY (INHALATION) EVERY 4 HOURS PRN
Qty: 1 EACH | Refills: 0 | Status: SHIPPED | OUTPATIENT
Start: 2023-03-28 | End: 2023-04-27

## 2023-03-28 NOTE — ED INITIAL ASSESSMENT (HPI)
Patient sent to ER from her PCP doctor due to her pneumonia not getting any better. Diagnosed 1.5 weeks ago. +SOB @ rest & w/ exertion.

## 2023-03-29 LAB
ATRIAL RATE: 78 BPM
P AXIS: 53 DEGREES
P-R INTERVAL: 150 MS
Q-T INTERVAL: 342 MS
QRS DURATION: 84 MS
QTC CALCULATION (BEZET): 389 MS
R AXIS: 32 DEGREES
T AXIS: 34 DEGREES
VENTRICULAR RATE: 78 BPM

## 2024-01-22 ENCOUNTER — TELEPHONE (OUTPATIENT)
Dept: NEUROLOGY | Facility: CLINIC | Age: 44
End: 2024-01-22

## 2024-01-22 NOTE — TELEPHONE ENCOUNTER
Received incoming fax from pharmacy asking for PA on Aimovig    Current PA expires 2/16/24.     Submitted through Epic. Will await outcome.

## 2024-03-16 DIAGNOSIS — G43.709 CHRONIC MIGRAINE WITHOUT AURA WITHOUT STATUS MIGRAINOSUS, NOT INTRACTABLE: ICD-10-CM

## 2024-03-18 RX ORDER — ERENUMAB-AOOE 140 MG/ML
140 INJECTION, SOLUTION SUBCUTANEOUS
Qty: 1 ML | Refills: 0 | Status: SHIPPED | OUTPATIENT
Start: 2024-03-18

## 2024-03-18 NOTE — TELEPHONE ENCOUNTER
Sent the patient a [employer] message to make an appt for further medication refills.       Medication:  AIMOVIG 140 MG/ML Subcutaneous Solution Auto-injector      Date of last refill: 02/16/2023 (#1mL/11)  [place]     Last office visit: 02/16/2023  Due back to clinic per last office note:  RTC 6-10 months  Date next office visit scheduled:    Future Appointments   Date Time Provider Department Center   4/24/2024  2:30 PM Adrienne Justin APRN [facility]           Last OV note recommendation:    Plan:    Cont aimovig 140  Mg , refills sent  Ubreivy 100 mg prn for acute attack, refills sent  Headache diary   RTC 8-10 months  Pt should go ER for any new or worsening symptoms

## 2024-05-06 DIAGNOSIS — G43.709 CHRONIC MIGRAINE WITHOUT AURA WITHOUT STATUS MIGRAINOSUS, NOT INTRACTABLE: ICD-10-CM

## 2024-05-07 NOTE — TELEPHONE ENCOUNTER
Sent the patient a [employer] message to make an appointment for further medication refills. Patient has not been seen since 02/16/2023 via tele-visit and last in office appointment was 05/05/2021.     Medication: AIMOVIG 140 MG/ML Subcutaneous Solution Auto-injector      Date of last refill: 03/18/2024 (#1 mL/0)  [place]     Last office visit: 02/16/20223  Due back to clinic per last office note:  8-10 months  Date next office visit scheduled:    Future Appointments   Date Time Provider Department Center   7/17/2024  2:30 PM Adrienne Justin APRN [facility] [place]           Last OV note recommendation:    Assessment:  Chronic migraine without aura without status migrainosus, not intractable; stable     Plan:    Cont aimovig 140  Mg , refills sent  Ubreivy 100 mg prn for acute attack, refills sent  Headache diary   RTC 8-10 months  Pt should go ER for any new or worsening symptoms

## 2024-05-10 RX ORDER — ERENUMAB-AOOE 140 MG/ML
140 INJECTION, SOLUTION SUBCUTANEOUS
Qty: 1 ML | Refills: 0 | OUTPATIENT
Start: 2024-05-10

## (undated) DIAGNOSIS — G43.709 CHRONIC MIGRAINE WITHOUT AURA WITHOUT STATUS MIGRAINOSUS, NOT INTRACTABLE: ICD-10-CM

## (undated) DEVICE — DRAPE,U/SHT,SPLIT,FILM,60X84,STERILE: Brand: MEDLINE

## (undated) DEVICE — CHLORAPREP 26ML APPLICATOR

## (undated) DEVICE — SPLINT PLASTER 5

## (undated) DEVICE — GLOVE SURG TRIUMPH SZ 8-1/2

## (undated) DEVICE — KENDALL SCD EXPRESS SLEEVES, KNEE LENGTH, MEDIUM: Brand: KENDALL SCD

## (undated) DEVICE — 3M™ IOBAN™ 2 ANTIMICROBIAL INCISE DRAPE 6650EZ: Brand: IOBAN™ 2

## (undated) DEVICE — DRILL BIT ZIM 3.5 4806-110-35

## (undated) DEVICE — STOCKING CMPR LG LNG THG LGTH

## (undated) DEVICE — Device

## (undated) DEVICE — STERILE HOOK LOCK LATEX FREE ELASTIC BANDAGE 6INX5YD: Brand: HOOK LOCK™

## (undated) DEVICE — ENDOPATH ULTRA VERESS INSUFFLATION NEEDLES WITH LUER LOCK CONNECTORS: Brand: ENDOPATH

## (undated) DEVICE — SUTURE VICRYL 0 CP-1

## (undated) DEVICE — LIGHT HANDLE

## (undated) DEVICE — DISPOSABLE LAPAROSCOPIC CLIP APPLIER WITH 20 CLIPS.: Brand: EPIX® UNIVERSAL CLIP APPLIER

## (undated) DEVICE — TROCAR: Brand: KII SHIELDED BLADED ACCESS SYSTEM

## (undated) DEVICE — GAUZE SPONGES,12 PLY: Brand: CURITY

## (undated) DEVICE — LAP CHOLE/APPY CDS-LF: Brand: MEDLINE INDUSTRIES, INC.

## (undated) DEVICE — SOL  .9 1000ML BTL

## (undated) DEVICE — STERILE POLYISOPRENE POWDER-FREE SURGICAL GLOVES: Brand: PROTEXIS

## (undated) DEVICE — SUTURE VICRYL 5-0 PC-1

## (undated) DEVICE — GOWN SURG AERO CHROME XXL

## (undated) DEVICE — TROCAR: Brand: KII SLEEVE

## (undated) DEVICE — MONOFILAMENT ABSORBABLE SUTURE: Brand: MAXON

## (undated) DEVICE — LOWER EXTREMITY CDS-LF: Brand: MEDLINE INDUSTRIES, INC.

## (undated) DEVICE — UNDYED BRAIDED (POLYGLACTIN 910), SYNTHETIC ABSORBABLE SUTURE: Brand: COATED VICRYL

## (undated) DEVICE — REM POLYHESIVE ADULT PATIENT RETURN ELECTRODE: Brand: VALLEYLAB

## (undated) DEVICE — C-ARM: Brand: UNBRANDED

## (undated) DEVICE — GLOVE ORTHO ALOETOUCH SZ 8-1/2

## (undated) DEVICE — ZIPWIRE GUIDEWIRE .035X150 STR

## (undated) DEVICE — TROCAR: Brand: KII FIOS FIRST ENTRY

## (undated) DEVICE — TIGERTAIL 5F FLXTIP 70CM

## (undated) DEVICE — CATH CHOLANGIOCATH LAP-13

## (undated) DEVICE — OCCLUSIVE GAUZE STRIP OVERWRAP,3% BISMUTH TRIBROMOPHENATE IN PETROLATUM BLEND: Brand: XEROFORM

## (undated) DEVICE — TOWEL: OR BLU 80/CS: Brand: MEDICAL ACTION INDUSTRIES

## (undated) DEVICE — SHEET,DRAPE,70X100,STERILE: Brand: MEDLINE

## (undated) DEVICE — SUTURE ETHILON 3-0 PS-1

## (undated) DEVICE — DRAPE C-ARM UNIVERSAL

## (undated) DEVICE — DRILL BIT ZIM 2.5 4806-110-25

## (undated) NOTE — LETTER
20    Patient: Mati Addison  : 1980 Visit date: 2020    Dear  Joaquín Gibson MD    Thank you for referring Mati Addison to my practice. Please find my assessment and plan below.       Assessment   Gallbladder surekha

## (undated) NOTE — LETTER
20    Patient: Paco Dickerson  : 1980 Visit date: 2020    Dear  Maxine Loyola MD    Thank you for referring Paco Dickerson to my practice. Please find my assessment and plan below.        Assessment   Gallbladder surekha The patient has a past medical history significant as listed above as well as type 1 diabetes.     Clinical examination of the abdomen reveals it to be soft, obese, nondistended, mildly tender in the right upper quadrant with a negative Nelson sign, bowel s

## (undated) NOTE — LETTER
[facility] Testing Department  Phone: [phone]  OUTSIDE TESTING RESULT REQUEST      TO:   Dr. Keny Jean-Baptiste          Today's Date: 11/13/20    FAX #: [phone]     IMPORTANT: FOR YOUR IMMEDIATE ATTENTION    Please FAX all test results listed b

## (undated) NOTE — LETTER
01/07/19      Dear Ren Carcamo,      We are contacting you from Dr. Carleen Nielsen office. We have tried to call you regarding the Aimovig medication, but have been unsuccessful. Your insurance is currently denying coverage for this medication.  You should however s

## (undated) NOTE — LETTER
[facility] Testing Department  Phone: [phone]  Right Fax: [phone]  [facility] [place] [facility] By:  Priyanka James RN Date: 7/31/18    Patient Name: Milagro Marina  Surgery Date: 8/2/2018    CSN: [id number]  Medical Record:

## (undated) NOTE — MR AVS SNAPSHOT
[address] [address]               Thank you for choosing us for your health care visit with Chandler Lennon MD.  We are glad to serve you and happy to provide you with this dexter ? By law, narcotics cannot be faxed or phoned into your pharmacy. The prescription must be signed by the provider, picked up in our office and physically brought to the pharmacy. A 30 day supply with no refills is the maximum allowed.   ? If your prescript approved and is a COVERED benefit. Although the [facility] staff does its due diligence, the insurance carrier gives the disclaimer that \"Although the procedure is authorized, this does not guarantee payment. \"    Ultimately the patient is responsible for payme scalp every 3 months for chronic migraine prophylaxis   Commonly known as:  BOTOX           SUMAtriptan Succinate 50 MG Tabs   Take 1 tablet (50 mg total) by mouth every 2 (two) hours as needed for Migraine. Take at onset of migraine.  Not more than 2 tabs

## (undated) NOTE — MR AVS SNAPSHOT
[facility] [place]  [address] [place] [id number]               Thank you for choosing us for your health care visit with Kim Tate MD.  We are glad to serve you and happy to provide you with this dexter ? Patient must present photo ID at time of . If a designated family member will be picking up prescription, office must be given name of individual in advance and they must present an ID as well. ?  The name of the person picking up your prescripti [facility] ([facility], [facility])    [address], [address] [address] [phone]              Allergies as of Feb 15, 2017     Dander Itching    Dust Itching    Sneezing    Pesticides SUMAtriptan Succinate 50 MG Tabs   Take 1 tablet (50 mg total) by mouth every 2 (two) hours as needed for Migraine. Take at onset of migraine. Not more than 2 tabs in 24 hrs.    Commonly known as:  IMITREX           Venlafaxine HCl  MG Cp24   Take 2

## (undated) NOTE — LETTER
[facility] Testing Department  Phone: [phone]  OUTSIDE TESTING RESULT REQUEST      TO:   Dr. Abena Yin      Today's Date: 11/13/20    FAX #: [phone]     IMPORTANT: FOR YOUR IMMEDIATE ATTENTION    Please FAX all test results listed below